# Patient Record
Sex: FEMALE | Race: BLACK OR AFRICAN AMERICAN | Employment: PART TIME | URBAN - METROPOLITAN AREA
[De-identification: names, ages, dates, MRNs, and addresses within clinical notes are randomized per-mention and may not be internally consistent; named-entity substitution may affect disease eponyms.]

---

## 2021-06-08 PROBLEM — E66.01 MORBID OBESITY (HCC): Status: ACTIVE | Noted: 2021-06-08

## 2021-06-08 PROBLEM — G47.33 OSA (OBSTRUCTIVE SLEEP APNEA): Status: ACTIVE | Noted: 2021-06-08

## 2021-06-08 PROBLEM — M54.9 BACK PAIN: Status: ACTIVE | Noted: 2021-06-08

## 2021-06-29 ENCOUNTER — HOSPITAL ENCOUNTER (OUTPATIENT)
Dept: PHYSICAL THERAPY | Age: 36
Discharge: HOME | End: 2021-06-29
Attending: SURGERY
Payer: COMMERCIAL

## 2021-06-29 DIAGNOSIS — E66.01 MORBID OBESITY (HCC): ICD-10-CM

## 2021-06-29 PROCEDURE — 97161 PT EVAL LOW COMPLEX 20 MIN: CPT

## 2021-06-29 NOTE — THERAPY EVALUATION
Barry Benson  : 1985  Primary: [facility]  Secondary:  [address]  at [facility] KRISTINA WHALEN  [address] [facility], [address] [place] [address],[address], [place] [address].  [phone]   [phone]       OUTPATIENT PHYSICAL THERAPY:Initial Assessment and Discharge Summary 2021     ICD-10: Treatment Diagnosis:  Morbid obesity  [E66.01]    Precautions/Allergies: NKDA     TREATMENT PLAN:  Effective Dates: 2021 for one visit MEDICAL/REFERRING DIAGNOSIS:  Morbid obesity ([facility] [E66.01]    DATE OF ONSET: gradual  REFERRING PHYSICIAN: Jaren Espino MD Orders: Eval and treat  Return MD Appointment: TBD     INITIAL ASSESSMENT:  Ms. Fiona Mcdonald is a 31year old female who comes to PT as part of the bariatric surgery program. She presents with good strength and balance and good ROM except as limited by adipose tissue. She has no issues that need to be addressed by PT at this time. She will be discharged from PT. PROBLEM LIST (Impacting functional limitations):  1. None INTERVENTIONS PLANNED: (Treatment may consist of any combination of the following)  1. None     GOALS: (Goals have been discussed and agreed upon with patient.)  Patient's stated goal is \"None\"  Discharge Goals: Time Frame: 1 visit  1. Patient to acknowledge the importance of exercise as part of a weight loss program. - MET    OUTCOME MEASURE:   Tool Used: Lower Extremity Functional Scale (LEFS)  Score:  Initial: 74/80 Most Recent: X (Date: -- )   Interpretation of Score: 20 questions each scored on a 5 point scale with 0 representing \"extreme difficulty or unable to perform\" and 4 representing \"no difficulty\". The lower the score, the greater the functional disability. 80/80 represents no disability. Minimal detectable change is 9 points.     Total Duration: 20 minutes  PT Patient Time In/Time Out  Time In: 7210  Time Out: 1287    Rehabilitation Potential For Stated Goals: Good  Regarding Rohini Madrigal's therapy, I certify that the treatment plan above will be carried out by a therapist or under their direction. Thank you for this referral,    Mai Ortiz, KRISSY      Referring Physician Signature: Chacha Mariano* No Signature is Required for this note. PAIN/SUBJECTIVE:   Initial: Pain Intensity 1: 0   Post Session:  None reported   HISTORY:   History of Injury/Illness (Reason for Referral):  Patient reports gradual gaining of weight over time. She used to be in [employer] and worked out regularly. She currently belongs to a gym. Past Medical History/Comorbidities:   From EMR and patient: Back pain, Morbid obesity, anxiety, and SHANTA (obstructive sleep apnea). Social History/Living Environment:     lives in one story home with 3 children - 15 8and 1years old. Prior Level of Function/Work/Activity:  Not working. Maintains the house. Ambulatory/Rehab Services H2 Model Falls Risk Assessment   Risk Factors:       No Risk Factors Identified Ability to Rise from Chair:       (0)  Ability to rise in a single movement   Falls Prevention Plan:       No modifications necessary   Total: (5 or greater = High Risk): 0   ©2010 [employer]. All Rights Reserved. [facility] Patent #[id number]. Federal Law prohibits the replication, distribution or use without written permission from [facility] [employer]   Current Medications: levonorgestrel-ethinyl estradiol   Date Last Reviewed:  6/29/21   Number of Personal Factors/Comorbidities that affect the Plan of Care: 0: LOW COMPLEXITY   EXAMINATION:     Observation/Orthostatic Postural Assessment: Obese patient in no acute distress. ROM: all ROM WFL except as limited by adipose tissue  Strength: 5/5 throughout UEs and LEs  Neurological Screen: light touch intact in UEs and LEs. Functional Mobility:  Independent        Balance:  SLS>= 10 seconds each leg     Body Structures Involved:  1. Joints  2.  Muscles Body Functions Affected:  1. Neuromusculoskeletal  2. Movement Related Activities and Participation Affected:  1. General Tasks and Demands  2.  Community, Social and [place]   Number of elements (examined above) that affect the Plan of Care: 1-2: LOW COMPLEXITY   CLINICAL PRESENTATION:   Presentation: Stable and uncomplicated: LOW COMPLEXITY   CLINICAL DECISION MAKING:   Use of outcome tool(s) and clinical judgement create a POC that gives a: Questionable prediction of patient's progress: MODERATE COMPLEXITY

## 2021-07-06 ENCOUNTER — HOSPITAL ENCOUNTER (OUTPATIENT)
Dept: LAB | Age: 36
Discharge: HOME | End: 2021-07-06
Attending: SURGERY
Payer: COMMERCIAL

## 2021-07-06 ENCOUNTER — HOSPITAL ENCOUNTER (OUTPATIENT)
Dept: GENERAL RADIOLOGY | Age: 36
Discharge: HOME | End: 2021-07-06
Attending: SURGERY
Payer: COMMERCIAL

## 2021-07-06 DIAGNOSIS — Z87.19 HX OF GASTROESOPHAGEAL REFLUX (GERD): ICD-10-CM

## 2021-07-06 DIAGNOSIS — Z01.812 ENCOUNTER FOR PRE-OPERATIVE LABORATORY TESTING: ICD-10-CM

## 2021-07-06 LAB
25(OH)D3 SERPL-MCNC: 11.7 NG/ML (ref 30–100)
EST. AVERAGE GLUCOSE BLD GHB EST-MCNC: NORMAL MG/DL
HBA1C MFR BLD: 4.8 % (ref 4.2–6.3)
TSH SERPL DL<=0.005 MIU/L-ACNC: 0.7 UIU/ML

## 2021-07-06 PROCEDURE — 74246 X-RAY XM UPR GI TRC 2CNTRST: CPT

## 2021-07-06 PROCEDURE — 36415 COLL VENOUS BLD VENIPUNCTURE: CPT

## 2021-07-06 PROCEDURE — 84443 ASSAY THYROID STIM HORMONE: CPT

## 2021-07-06 PROCEDURE — 74011000250 HC RX REV CODE- 250: Performed by: SURGERY

## 2021-07-06 PROCEDURE — 83036 HEMOGLOBIN GLYCOSYLATED A1C: CPT

## 2021-07-06 PROCEDURE — 80323 ALKALOIDS NOS: CPT

## 2021-07-06 PROCEDURE — 82306 VITAMIN D 25 HYDROXY: CPT

## 2021-07-06 RX ADMIN — ANTACID/ANTIFLATULENT 4 G: 380; 550; 10; 10 GRANULE, EFFERVESCENT ORAL at 08:20

## 2021-07-06 RX ADMIN — BARIUM SULFATE 135 ML: 980 POWDER, FOR SUSPENSION ORAL at 08:20

## 2021-07-06 RX ADMIN — BARIUM SULFATE 355 ML: 0.6 SUSPENSION ORAL at 08:24

## 2021-07-09 LAB
COTININE SERPL-MCNC: 1.9 NG/ML
NICOTINE SERPL-MCNC: <1 NG/ML

## 2021-09-03 ENCOUNTER — HOSPITAL ENCOUNTER (OUTPATIENT)
Dept: SURGERY | Age: 36
Discharge: HOME | End: 2021-09-03
Attending: SURGERY
Payer: COMMERCIAL

## 2021-09-03 VITALS
HEART RATE: 81 BPM | DIASTOLIC BLOOD PRESSURE: 72 MMHG | WEIGHT: 293 LBS | RESPIRATION RATE: 16 BRPM | OXYGEN SATURATION: 100 % | BODY MASS INDEX: 45.99 KG/M2 | SYSTOLIC BLOOD PRESSURE: 132 MMHG | HEIGHT: 67 IN | TEMPERATURE: 97.7 F

## 2021-09-03 LAB — HGB BLD-MCNC: 12.7 G/DL (ref 11.7–15.4)

## 2021-09-03 PROCEDURE — 36415 COLL VENOUS BLD VENIPUNCTURE: CPT

## 2021-09-03 PROCEDURE — 85018 HEMOGLOBIN: CPT

## 2021-09-03 RX ORDER — HYDROXYZINE 25 MG/1
25 TABLET, FILM COATED ORAL
COMMUNITY

## 2021-09-03 NOTE — PERIOP NOTES
Patient verified name and     Order for consent not found in EHR ; patient verified. Type 2 surgery, Walk in assessment complete. Labs per surgeon: none  Labs per anesthesia protocol: hgb; results - processing  EKG: not needed per protocols but most [facility]. Patient COVID test date 9/10/21. The testing center is located at the [address], [place]. If appointment is needed patient provided telephone number of [phone]. Hospital approved surgical skin cleanser and instructions given per hospital policy. Patient provided with and instructed on educational handouts including Guide to Surgery, Pain Management, Hand Hygiene, Blood Transfusion Education, and [facility] Brochure. Patient answered medical/surgical history questions at their best of ability. All prior to admission medications documented in [facility]. Original medication prescription bottle not visualized during patient appointment. Patient instructed to hold all vitamins 7 days prior to surgery and NSAIDS 5 days prior to surgery, patient verbalized understanding. Patient teach back successful and patient demonstrates knowledge of instructions.

## 2021-09-03 NOTE — PERIOP NOTES
The following records have been requested from [address] [place] [address] [facility] records:       Please send EKG x 2 (7/31/21, 7/7/20) via fax to [phone] for anesthesia review prior to upcoming surgery. Thank you!

## 2021-09-03 NOTE — PERIOP NOTES
PLEASE CONTINUE TAKING ALL PRESCRIPTION MEDICATIONS UP TO THE DAY OF SURGERY UNLESS OTHERWISE DIRECTED BELOW. DISCONTINUE all vitamins and supplements 7 days prior to surgery. DISCONTINUE Non-Steriodal Anti-Inflammatory (NSAIDS) such as Advil and Aleve 5 days prior to surgery. Home Medications to take  the day of surgery     Flexeril if needed      Hydroxyzine if needed     Birth control pill      Home Medications   to Hold     Hold Mobic for 5 days prior to surgery. Comments    Covid test 9/10/21  3:30 pm @ [address] [place] [address]    On the day before surgery please take Acetaminophen 1000mg in the morning and then again before bed. You may substitute for Tylenol 650 mg. Please do not bring home medications with you on the day of surgery unless otherwise directed by your nurse. If you are instructed to bring home medications, please give them to your nurse as they will be administered by the nursing staff. If you have any questions, please call [facility] [phone]     A copy of this note was provided to the patient for reference.

## 2021-09-03 NOTE — PERIOP NOTES
SURGICAL WEIGHT LOSS Enhanced Recovery After Surgery: diabetic and non-diabetic patients      The night before surgery 9/12/21, drink 1 bottles of the Ensure Pre-Surgery drink. The morning of surgery 9/13/21, drink 1/2 bottle of the Ensure Pre-Surgery drink while on your way to the hospital. Drink this over 5-10 minutes. Drink nothing else after drinking the pre-surgical drink the morning of surgery. Bring your patient handbook with you to the hospital.        Things to Remember:      1. You will be up in a chair the evening of surgery and sipping on clear liquids, once released by your surgeon. 2. Beginning the day of surgery, you will be out of the bed as able, once released by your hospital team. We encourage you to be sitting up in a chair, walking in the fields, doing exercises as advised by physical therapy, etc.       3. You will be given scheduled non-narcotic pain medication to help keep your pain under control. You will have stronger pain medication ordered for break through pain. 4. All of these measures are geared toward improving your overall surgical recovery and   decreasing the risk of complications.

## 2021-09-08 RX ORDER — CEPHALEXIN 500 MG/1
500 CAPSULE ORAL 3 TIMES DAILY
COMMUNITY
End: 2021-09-14

## 2021-09-09 NOTE — PERIOP NOTES
Received EKG dated 7/31/21 from [facility]. Record scanned into media tab in EHR for reference if needed.

## 2021-09-12 ENCOUNTER — ANESTHESIA EVENT (OUTPATIENT)
Dept: SURGERY | Age: 36
DRG: 403 | End: 2021-09-12
Payer: COMMERCIAL

## 2021-09-13 ENCOUNTER — HOSPITAL ENCOUNTER (INPATIENT)
Age: 36
LOS: 1 days | Discharge: HOME | DRG: 403 | End: 2021-09-14
Attending: SURGERY | Admitting: SURGERY
Payer: COMMERCIAL

## 2021-09-13 ENCOUNTER — ANESTHESIA (OUTPATIENT)
Dept: SURGERY | Age: 36
DRG: 403 | End: 2021-09-13
Payer: COMMERCIAL

## 2021-09-13 DIAGNOSIS — E66.01 MORBID OBESITY (HCC): ICD-10-CM

## 2021-09-13 DIAGNOSIS — G47.33 OSA (OBSTRUCTIVE SLEEP APNEA): ICD-10-CM

## 2021-09-13 LAB
ABO + RH BLD: NORMAL
BLOOD GROUP ANTIBODIES SERPL: NORMAL
HCG UR QL: NEGATIVE
SPECIMEN EXP DATE BLD: NORMAL

## 2021-09-13 PROCEDURE — 74011000250 HC RX REV CODE- 250: Performed by: REGISTERED NURSE

## 2021-09-13 PROCEDURE — 77030036554: Performed by: SURGERY

## 2021-09-13 PROCEDURE — 65270000029 HC RM PRIVATE

## 2021-09-13 PROCEDURE — 77030039425 HC BLD LARYNG TRULITE DISP TELE -A: Performed by: ANESTHESIOLOGY

## 2021-09-13 PROCEDURE — 74011000250 HC RX REV CODE- 250: Performed by: SURGERY

## 2021-09-13 PROCEDURE — 77030041460 HC APL VISTASEAL J&J -B: Performed by: SURGERY

## 2021-09-13 PROCEDURE — 77030020703 HC SEAL CANN DISP INTU -B: Performed by: SURGERY

## 2021-09-13 PROCEDURE — 77030040922 HC BLNKT HYPOTHRM STRY -A: Performed by: ANESTHESIOLOGY

## 2021-09-13 PROCEDURE — 77030031492 HC PRT ACC BLNT AIRSEAL CNMD -B: Performed by: SURGERY

## 2021-09-13 PROCEDURE — 74011250637 HC RX REV CODE- 250/637: Performed by: PHYSICIAN ASSISTANT

## 2021-09-13 PROCEDURE — 77030019908 HC STETH ESOPH SIMS -A: Performed by: ANESTHESIOLOGY

## 2021-09-13 PROCEDURE — 74011250637 HC RX REV CODE- 250/637: Performed by: NURSE PRACTITIONER

## 2021-09-13 PROCEDURE — 74011000250 HC RX REV CODE- 250: Performed by: PHYSICIAN ASSISTANT

## 2021-09-13 PROCEDURE — 76060000037 HC ANESTHESIA 3 TO 3.5 HR: Performed by: SURGERY

## 2021-09-13 PROCEDURE — 77030035279 HC SEAL VSL ENDOWR XI INTU -I2: Performed by: SURGERY

## 2021-09-13 PROCEDURE — 77030002996 HC SUT SLK J&J -A: Performed by: SURGERY

## 2021-09-13 PROCEDURE — 74011250636 HC RX REV CODE- 250/636: Performed by: PHYSICIAN ASSISTANT

## 2021-09-13 PROCEDURE — 43644 LAP GASTRIC BYPASS/ROUX-EN-Y: CPT | Performed by: SURGERY

## 2021-09-13 PROCEDURE — 77030035278 HC STPLR SEAL ENDOWR INTU -B: Performed by: SURGERY

## 2021-09-13 PROCEDURE — 86901 BLOOD TYPING SEROLOGIC RH(D): CPT

## 2021-09-13 PROCEDURE — 77030035277 HC OBTRTR BLDELSS DISP INTU -B: Performed by: SURGERY

## 2021-09-13 PROCEDURE — 74011250636 HC RX REV CODE- 250/636: Performed by: REGISTERED NURSE

## 2021-09-13 PROCEDURE — S2900 ROBOTIC SURGICAL SYSTEM: HCPCS | Performed by: SURGERY

## 2021-09-13 PROCEDURE — 77030016151 HC PROTCTR LNS DFOG COVD -B: Performed by: SURGERY

## 2021-09-13 PROCEDURE — 94760 N-INVAS EAR/PLS OXIMETRY 1: CPT

## 2021-09-13 PROCEDURE — 77030040361 HC SLV COMPR DVT MDII -B: Performed by: SURGERY

## 2021-09-13 PROCEDURE — 77030010513 HC APPL CLP LIG J&J -C: Performed by: SURGERY

## 2021-09-13 PROCEDURE — 74011250636 HC RX REV CODE- 250/636: Performed by: ANESTHESIOLOGY

## 2021-09-13 PROCEDURE — 77030040260 HC STPLR RELD SUREFORM DVNCI INTU -C: Performed by: SURGERY

## 2021-09-13 PROCEDURE — 81025 URINE PREGNANCY TEST: CPT

## 2021-09-13 PROCEDURE — 77030007955 HC PCH ENDOSC SPEC J&J -B: Performed by: SURGERY

## 2021-09-13 PROCEDURE — 2709999900 HC NON-CHARGEABLE SUPPLY

## 2021-09-13 PROCEDURE — 77030010507 HC ADH SKN DERMBND J&J -B: Performed by: SURGERY

## 2021-09-13 PROCEDURE — 77030031139 HC SUT VCRL2 J&J -A: Performed by: SURGERY

## 2021-09-13 PROCEDURE — 74011250636 HC RX REV CODE- 250/636: Performed by: SURGERY

## 2021-09-13 PROCEDURE — 77030035489 HC REDUCR CANN ENDOWR INTU -C: Performed by: SURGERY

## 2021-09-13 PROCEDURE — 0D164ZA BYPASS STOMACH TO JEJUNUM, PERCUTANEOUS ENDOSCOPIC APPROACH: ICD-10-PCS | Performed by: SURGERY

## 2021-09-13 PROCEDURE — 77030037088 HC TUBE ENDOTRACH ORAL NSL COVD-A: Performed by: ANESTHESIOLOGY

## 2021-09-13 PROCEDURE — 77030041524 HC SEALNT FIBRN VITASEAL J&J -C: Performed by: SURGERY

## 2021-09-13 PROCEDURE — APPNB30 APP NON BILLABLE TIME 0-30 MINS: Performed by: PHYSICIAN ASSISTANT

## 2021-09-13 PROCEDURE — 74011250637 HC RX REV CODE- 250/637: Performed by: ANESTHESIOLOGY

## 2021-09-13 PROCEDURE — 76210000006 HC OR PH I REC 0.5 TO 1 HR: Performed by: SURGERY

## 2021-09-13 PROCEDURE — 77030008771 HC TU NG SALEM SUMP -A: Performed by: ANESTHESIOLOGY

## 2021-09-13 PROCEDURE — 8E0W4CZ ROBOTIC ASSISTED PROCEDURE OF TRUNK REGION, PERCUTANEOUS ENDOSCOPIC APPROACH: ICD-10-PCS | Performed by: SURGERY

## 2021-09-13 PROCEDURE — 2709999900 HC NON-CHARGEABLE SUPPLY: Performed by: SURGERY

## 2021-09-13 PROCEDURE — 76010000878 HC OR TIME 3 TO 3.5HR INTENSV - TIER 2: Performed by: SURGERY

## 2021-09-13 PROCEDURE — 77030002933 HC SUT MCRYL J&J -A: Performed by: SURGERY

## 2021-09-13 PROCEDURE — 77030040259 HC STPLR DEV SUREFORM DVNCI INTU -F: Performed by: SURGERY

## 2021-09-13 PROCEDURE — C9113 INJ PANTOPRAZOLE SODIUM, VIA: HCPCS | Performed by: SURGERY

## 2021-09-13 PROCEDURE — 77030034461 HC TIP ENDO SUC IRR STRYKFLW STRY -B: Performed by: SURGERY

## 2021-09-13 RX ORDER — KETOROLAC TROMETHAMINE 30 MG/ML
15 INJECTION, SOLUTION INTRAMUSCULAR; INTRAVENOUS EVERY 6 HOURS
Status: DISCONTINUED | OUTPATIENT
Start: 2021-09-13 | End: 2021-09-14 | Stop reason: HOSPADM

## 2021-09-13 RX ORDER — BUPIVACAINE HYDROCHLORIDE 2.5 MG/ML
INJECTION, SOLUTION EPIDURAL; INFILTRATION; INTRACAUDAL AS NEEDED
Status: DISCONTINUED | OUTPATIENT
Start: 2021-09-13 | End: 2021-09-13 | Stop reason: HOSPADM

## 2021-09-13 RX ORDER — GABAPENTIN 300 MG/1
300 CAPSULE ORAL 3 TIMES DAILY
Status: DISCONTINUED | OUTPATIENT
Start: 2021-09-13 | End: 2021-09-14 | Stop reason: HOSPADM

## 2021-09-13 RX ORDER — ACETAMINOPHEN 500 MG
1000 TABLET ORAL ONCE
Status: COMPLETED | OUTPATIENT
Start: 2021-09-13 | End: 2021-09-13

## 2021-09-13 RX ORDER — SODIUM CHLORIDE AND POTASSIUM CHLORIDE .9; .15 G/100ML; G/100ML
SOLUTION INTRAVENOUS CONTINUOUS
Status: DISCONTINUED | OUTPATIENT
Start: 2021-09-13 | End: 2021-09-13

## 2021-09-13 RX ORDER — ONDANSETRON 2 MG/ML
INJECTION INTRAMUSCULAR; INTRAVENOUS AS NEEDED
Status: DISCONTINUED | OUTPATIENT
Start: 2021-09-13 | End: 2021-09-13 | Stop reason: HOSPADM

## 2021-09-13 RX ORDER — HYDROMORPHONE HYDROCHLORIDE 1 MG/ML
0.5 INJECTION, SOLUTION INTRAMUSCULAR; INTRAVENOUS; SUBCUTANEOUS
Status: DISCONTINUED | OUTPATIENT
Start: 2021-09-13 | End: 2021-09-14 | Stop reason: HOSPADM

## 2021-09-13 RX ORDER — LIDOCAINE HYDROCHLORIDE ANHYDROUS AND DEXTROSE MONOHYDRATE .8; 5 G/100ML; G/100ML
1 INJECTION, SOLUTION INTRAVENOUS CONTINUOUS
Status: DISCONTINUED | OUTPATIENT
Start: 2021-09-13 | End: 2021-09-13 | Stop reason: SDUPTHER

## 2021-09-13 RX ORDER — SUCRALFATE 1 G/1
1 TABLET ORAL 4 TIMES DAILY
Status: DISCONTINUED | OUTPATIENT
Start: 2021-09-13 | End: 2021-09-14 | Stop reason: HOSPADM

## 2021-09-13 RX ORDER — OXYCODONE HYDROCHLORIDE 5 MG/1
5 TABLET ORAL
Status: DISCONTINUED | OUTPATIENT
Start: 2021-09-13 | End: 2021-09-13 | Stop reason: HOSPADM

## 2021-09-13 RX ORDER — SODIUM CHLORIDE AND POTASSIUM CHLORIDE .9; .15 G/100ML; G/100ML
SOLUTION INTRAVENOUS CONTINUOUS
Status: DISCONTINUED | OUTPATIENT
Start: 2021-09-13 | End: 2021-09-14 | Stop reason: HOSPADM

## 2021-09-13 RX ORDER — ROCURONIUM BROMIDE 10 MG/ML
INJECTION, SOLUTION INTRAVENOUS AS NEEDED
Status: DISCONTINUED | OUTPATIENT
Start: 2021-09-13 | End: 2021-09-13 | Stop reason: HOSPADM

## 2021-09-13 RX ORDER — SUCCINYLCHOLINE CHLORIDE 20 MG/ML
INJECTION INTRAMUSCULAR; INTRAVENOUS AS NEEDED
Status: DISCONTINUED | OUTPATIENT
Start: 2021-09-13 | End: 2021-09-13 | Stop reason: HOSPADM

## 2021-09-13 RX ORDER — SCOLOPAMINE TRANSDERMAL SYSTEM 1 MG/1
1 PATCH, EXTENDED RELEASE TRANSDERMAL ONCE
Status: DISCONTINUED | OUTPATIENT
Start: 2021-09-13 | End: 2021-09-13 | Stop reason: HOSPADM

## 2021-09-13 RX ORDER — SODIUM CHLORIDE, SODIUM LACTATE, POTASSIUM CHLORIDE, CALCIUM CHLORIDE 600; 310; 30; 20 MG/100ML; MG/100ML; MG/100ML; MG/100ML
INJECTION, SOLUTION INTRAVENOUS
Status: DISCONTINUED | OUTPATIENT
Start: 2021-09-13 | End: 2021-09-13

## 2021-09-13 RX ORDER — PROPOFOL 10 MG/ML
INJECTION, EMULSION INTRAVENOUS AS NEEDED
Status: DISCONTINUED | OUTPATIENT
Start: 2021-09-13 | End: 2021-09-13 | Stop reason: HOSPADM

## 2021-09-13 RX ORDER — LIDOCAINE HYDROCHLORIDE ANHYDROUS AND DEXTROSE MONOHYDRATE .8; 5 G/100ML; G/100ML
1 INJECTION, SOLUTION INTRAVENOUS CONTINUOUS
Status: DISPENSED | OUTPATIENT
Start: 2021-09-13 | End: 2021-09-14

## 2021-09-13 RX ORDER — METOCLOPRAMIDE 10 MG/1
5 TABLET ORAL ONCE
Status: COMPLETED | OUTPATIENT
Start: 2021-09-13 | End: 2021-09-13

## 2021-09-13 RX ORDER — FENTANYL CITRATE 50 UG/ML
INJECTION, SOLUTION INTRAMUSCULAR; INTRAVENOUS AS NEEDED
Status: DISCONTINUED | OUTPATIENT
Start: 2021-09-13 | End: 2021-09-13 | Stop reason: HOSPADM

## 2021-09-13 RX ORDER — PANTOPRAZOLE SODIUM 40 MG/10ML
40 INJECTION, POWDER, LYOPHILIZED, FOR SOLUTION INTRAVENOUS DAILY
Status: DISCONTINUED | OUTPATIENT
Start: 2021-09-13 | End: 2021-09-13 | Stop reason: SDUPTHER

## 2021-09-13 RX ORDER — APREPITANT 40 MG/1
40 CAPSULE ORAL ONCE
Status: COMPLETED | OUTPATIENT
Start: 2021-09-13 | End: 2021-09-13

## 2021-09-13 RX ORDER — OXYCODONE HYDROCHLORIDE 5 MG/1
5 TABLET ORAL
Status: DISCONTINUED | OUTPATIENT
Start: 2021-09-13 | End: 2021-09-13 | Stop reason: ALTCHOICE

## 2021-09-13 RX ORDER — HYDROMORPHONE HYDROCHLORIDE 2 MG/ML
0.5 INJECTION, SOLUTION INTRAMUSCULAR; INTRAVENOUS; SUBCUTANEOUS
Status: DISCONTINUED | OUTPATIENT
Start: 2021-09-13 | End: 2021-09-13 | Stop reason: HOSPADM

## 2021-09-13 RX ORDER — DEXAMETHASONE SODIUM PHOSPHATE 4 MG/ML
INJECTION, SOLUTION INTRA-ARTICULAR; INTRALESIONAL; INTRAMUSCULAR; INTRAVENOUS; SOFT TISSUE AS NEEDED
Status: DISCONTINUED | OUTPATIENT
Start: 2021-09-13 | End: 2021-09-13 | Stop reason: HOSPADM

## 2021-09-13 RX ORDER — SODIUM CHLORIDE, SODIUM LACTATE, POTASSIUM CHLORIDE, CALCIUM CHLORIDE 600; 310; 30; 20 MG/100ML; MG/100ML; MG/100ML; MG/100ML
25 INJECTION, SOLUTION INTRAVENOUS CONTINUOUS
Status: DISCONTINUED | OUTPATIENT
Start: 2021-09-13 | End: 2021-09-13 | Stop reason: HOSPADM

## 2021-09-13 RX ORDER — ALBUTEROL SULFATE 0.83 MG/ML
2.5 SOLUTION RESPIRATORY (INHALATION) AS NEEDED
Status: DISCONTINUED | OUTPATIENT
Start: 2021-09-13 | End: 2021-09-13 | Stop reason: HOSPADM

## 2021-09-13 RX ORDER — HEPARIN SODIUM 5000 [USP'U]/ML
5000 INJECTION, SOLUTION INTRAVENOUS; SUBCUTANEOUS ONCE
Status: COMPLETED | OUTPATIENT
Start: 2021-09-13 | End: 2021-09-13

## 2021-09-13 RX ORDER — LIDOCAINE HYDROCHLORIDE 10 MG/ML
0.1 INJECTION INFILTRATION; PERINEURAL AS NEEDED
Status: DISCONTINUED | OUTPATIENT
Start: 2021-09-13 | End: 2021-09-13 | Stop reason: HOSPADM

## 2021-09-13 RX ORDER — HEPARIN SODIUM 5000 [USP'U]/ML
5000 INJECTION, SOLUTION INTRAVENOUS; SUBCUTANEOUS EVERY 8 HOURS
Status: DISCONTINUED | OUTPATIENT
Start: 2021-09-13 | End: 2021-09-14 | Stop reason: HOSPADM

## 2021-09-13 RX ORDER — KETAMINE HYDROCHLORIDE 50 MG/ML
INJECTION, SOLUTION INTRAMUSCULAR; INTRAVENOUS AS NEEDED
Status: DISCONTINUED | OUTPATIENT
Start: 2021-09-13 | End: 2021-09-13 | Stop reason: HOSPADM

## 2021-09-13 RX ORDER — CELECOXIB 100 MG/1
100 CAPSULE ORAL 2 TIMES DAILY
Status: DISCONTINUED | OUTPATIENT
Start: 2021-09-14 | End: 2021-09-13

## 2021-09-13 RX ORDER — LIDOCAINE HYDROCHLORIDE 20 MG/ML
INJECTION, SOLUTION EPIDURAL; INFILTRATION; INTRACAUDAL; PERINEURAL AS NEEDED
Status: DISCONTINUED | OUTPATIENT
Start: 2021-09-13 | End: 2021-09-13 | Stop reason: HOSPADM

## 2021-09-13 RX ORDER — ONDANSETRON 2 MG/ML
4 INJECTION INTRAMUSCULAR; INTRAVENOUS
Status: DISCONTINUED | OUTPATIENT
Start: 2021-09-13 | End: 2021-09-13 | Stop reason: HOSPADM

## 2021-09-13 RX ORDER — SCOLOPAMINE TRANSDERMAL SYSTEM 1 MG/1
1 PATCH, EXTENDED RELEASE TRANSDERMAL ONCE
Status: DISCONTINUED | OUTPATIENT
Start: 2021-09-13 | End: 2021-09-14 | Stop reason: HOSPADM

## 2021-09-13 RX ORDER — DIPHENHYDRAMINE HYDROCHLORIDE 50 MG/ML
12.5 INJECTION, SOLUTION INTRAMUSCULAR; INTRAVENOUS
Status: DISCONTINUED | OUTPATIENT
Start: 2021-09-13 | End: 2021-09-14 | Stop reason: HOSPADM

## 2021-09-13 RX ORDER — ACETAMINOPHEN 500 MG
1000 TABLET ORAL EVERY 6 HOURS
Status: DISCONTINUED | OUTPATIENT
Start: 2021-09-13 | End: 2021-09-14 | Stop reason: HOSPADM

## 2021-09-13 RX ORDER — LIDOCAINE HYDROCHLORIDE ANHYDROUS AND DEXTROSE MONOHYDRATE .8; 5 G/100ML; G/100ML
INJECTION, SOLUTION INTRAVENOUS
Status: DISCONTINUED | OUTPATIENT
Start: 2021-09-13 | End: 2021-09-13 | Stop reason: HOSPADM

## 2021-09-13 RX ORDER — NALOXONE HYDROCHLORIDE 0.4 MG/ML
0.4 INJECTION, SOLUTION INTRAMUSCULAR; INTRAVENOUS; SUBCUTANEOUS AS NEEDED
Status: DISCONTINUED | OUTPATIENT
Start: 2021-09-13 | End: 2021-09-14 | Stop reason: HOSPADM

## 2021-09-13 RX ORDER — TRAMADOL HYDROCHLORIDE 50 MG/1
50 TABLET ORAL
Status: DISCONTINUED | OUTPATIENT
Start: 2021-09-13 | End: 2021-09-14 | Stop reason: HOSPADM

## 2021-09-13 RX ORDER — ONDANSETRON 2 MG/ML
4 INJECTION INTRAMUSCULAR; INTRAVENOUS EVERY 4 HOURS
Status: DISCONTINUED | OUTPATIENT
Start: 2021-09-13 | End: 2021-09-14 | Stop reason: HOSPADM

## 2021-09-13 RX ORDER — APREPITANT 40 MG/1
40 CAPSULE ORAL ONCE
Status: DISCONTINUED | OUTPATIENT
Start: 2021-09-13 | End: 2021-09-13 | Stop reason: HOSPADM

## 2021-09-13 RX ORDER — MIDAZOLAM HYDROCHLORIDE 1 MG/ML
2 INJECTION, SOLUTION INTRAMUSCULAR; INTRAVENOUS
Status: COMPLETED | OUTPATIENT
Start: 2021-09-13 | End: 2021-09-13

## 2021-09-13 RX ORDER — KETOROLAC TROMETHAMINE 30 MG/ML
INJECTION, SOLUTION INTRAMUSCULAR; INTRAVENOUS AS NEEDED
Status: DISCONTINUED | OUTPATIENT
Start: 2021-09-13 | End: 2021-09-13 | Stop reason: HOSPADM

## 2021-09-13 RX ADMIN — ROCURONIUM BROMIDE 30 MG: 10 INJECTION, SOLUTION INTRAVENOUS at 07:50

## 2021-09-13 RX ADMIN — SUCCINYLCHOLINE CHLORIDE 160 MG: 20 INJECTION, SOLUTION INTRAMUSCULAR; INTRAVENOUS at 07:51

## 2021-09-13 RX ADMIN — SUGAMMADEX 300 MG: 100 INJECTION, SOLUTION INTRAVENOUS at 10:31

## 2021-09-13 RX ADMIN — ROCURONIUM BROMIDE 10 MG: 10 INJECTION, SOLUTION INTRAVENOUS at 08:41

## 2021-09-13 RX ADMIN — LIDOCAINE HYDROCHLORIDE 100 MG: 20 INJECTION, SOLUTION EPIDURAL; INFILTRATION; INTRACAUDAL; PERINEURAL at 07:50

## 2021-09-13 RX ADMIN — HYDROMORPHONE HYDROCHLORIDE 0.5 MG: 1 INJECTION, SOLUTION INTRAMUSCULAR; INTRAVENOUS; SUBCUTANEOUS at 21:00

## 2021-09-13 RX ADMIN — ACETAMINOPHEN 1000 MG: 500 TABLET, FILM COATED ORAL at 21:03

## 2021-09-13 RX ADMIN — ACETAMINOPHEN 1000 MG: 500 TABLET, FILM COATED ORAL at 06:46

## 2021-09-13 RX ADMIN — ONDANSETRON 4 MG: 2 INJECTION INTRAMUSCULAR; INTRAVENOUS at 20:29

## 2021-09-13 RX ADMIN — PHENYLEPHRINE HYDROCHLORIDE 100 MCG: 10 INJECTION INTRAVENOUS at 08:23

## 2021-09-13 RX ADMIN — ROCURONIUM BROMIDE 20 MG: 10 INJECTION, SOLUTION INTRAVENOUS at 09:30

## 2021-09-13 RX ADMIN — KETOROLAC TROMETHAMINE 15 MG: 30 INJECTION, SOLUTION INTRAMUSCULAR; INTRAVENOUS at 16:03

## 2021-09-13 RX ADMIN — FENTANYL CITRATE 50 MCG: 50 INJECTION INTRAMUSCULAR; INTRAVENOUS at 07:50

## 2021-09-13 RX ADMIN — GABAPENTIN 300 MG: 300 CAPSULE ORAL at 16:04

## 2021-09-13 RX ADMIN — PROPOFOL 200 MG: 10 INJECTION, EMULSION INTRAVENOUS at 07:50

## 2021-09-13 RX ADMIN — ONDANSETRON 4 MG: 2 INJECTION INTRAMUSCULAR; INTRAVENOUS at 12:40

## 2021-09-13 RX ADMIN — PHENYLEPHRINE HYDROCHLORIDE 100 MCG: 10 INJECTION INTRAVENOUS at 08:33

## 2021-09-13 RX ADMIN — CEFAZOLIN 3 G: 1 INJECTION, POWDER, FOR SOLUTION INTRAVENOUS at 08:09

## 2021-09-13 RX ADMIN — SUCRALFATE 1 G: 1 TABLET ORAL at 21:03

## 2021-09-13 RX ADMIN — SODIUM CHLORIDE 40 MG: 9 INJECTION, SOLUTION INTRAMUSCULAR; INTRAVENOUS; SUBCUTANEOUS at 20:29

## 2021-09-13 RX ADMIN — METOCLOPRAMIDE 5 MG: 10 TABLET ORAL at 06:46

## 2021-09-13 RX ADMIN — GABAPENTIN 300 MG: 300 CAPSULE ORAL at 21:03

## 2021-09-13 RX ADMIN — OXYCODONE 5 MG: 5 TABLET ORAL at 16:07

## 2021-09-13 RX ADMIN — PHENYLEPHRINE HYDROCHLORIDE 100 MCG: 10 INJECTION INTRAVENOUS at 07:59

## 2021-09-13 RX ADMIN — SUCRALFATE 1 G: 1 TABLET ORAL at 17:34

## 2021-09-13 RX ADMIN — PHENYLEPHRINE HYDROCHLORIDE 150 MCG: 10 INJECTION INTRAVENOUS at 08:19

## 2021-09-13 RX ADMIN — PHENYLEPHRINE HYDROCHLORIDE 100 MCG: 10 INJECTION INTRAVENOUS at 08:05

## 2021-09-13 RX ADMIN — MIDAZOLAM 2 MG: 1 INJECTION INTRAMUSCULAR; INTRAVENOUS at 07:10

## 2021-09-13 RX ADMIN — KETAMINE HYDROCHLORIDE 50 MG: 50 INJECTION, SOLUTION INTRAMUSCULAR; INTRAVENOUS at 07:50

## 2021-09-13 RX ADMIN — ACETAMINOPHEN 1000 MG: 500 TABLET, FILM COATED ORAL at 12:40

## 2021-09-13 RX ADMIN — ROCURONIUM BROMIDE 10 MG: 10 INJECTION, SOLUTION INTRAVENOUS at 09:54

## 2021-09-13 RX ADMIN — DEXAMETHASONE SODIUM PHOSPHATE 10 MG: 4 INJECTION, SOLUTION INTRAMUSCULAR; INTRAVENOUS at 09:37

## 2021-09-13 RX ADMIN — FOLIC ACID: 5 INJECTION, SOLUTION INTRAMUSCULAR; INTRAVENOUS; SUBCUTANEOUS at 12:52

## 2021-09-13 RX ADMIN — ONDANSETRON 4 MG: 2 INJECTION INTRAMUSCULAR; INTRAVENOUS at 10:27

## 2021-09-13 RX ADMIN — ROCURONIUM BROMIDE 20 MG: 10 INJECTION, SOLUTION INTRAVENOUS at 08:11

## 2021-09-13 RX ADMIN — PHENYLEPHRINE HYDROCHLORIDE 100 MCG: 10 INJECTION INTRAVENOUS at 08:30

## 2021-09-13 RX ADMIN — SODIUM CHLORIDE, SODIUM LACTATE, POTASSIUM CHLORIDE, AND CALCIUM CHLORIDE 900 ML/HR: 600; 310; 30; 20 INJECTION, SOLUTION INTRAVENOUS at 07:10

## 2021-09-13 RX ADMIN — FENTANYL CITRATE 25 MCG: 50 INJECTION INTRAMUSCULAR; INTRAVENOUS at 09:38

## 2021-09-13 RX ADMIN — HEPARIN SODIUM 5000 UNITS: 5000 INJECTION INTRAVENOUS; SUBCUTANEOUS at 07:05

## 2021-09-13 RX ADMIN — ROCURONIUM BROMIDE 10 MG: 10 INJECTION, SOLUTION INTRAVENOUS at 08:50

## 2021-09-13 RX ADMIN — KETOROLAC TROMETHAMINE 30 MG: 30 INJECTION, SOLUTION INTRAMUSCULAR; INTRAVENOUS at 10:41

## 2021-09-13 RX ADMIN — SODIUM CHLORIDE, SODIUM LACTATE, POTASSIUM CHLORIDE, AND CALCIUM CHLORIDE: 600; 310; 30; 20 INJECTION, SOLUTION INTRAVENOUS at 08:26

## 2021-09-13 RX ADMIN — HYDROMORPHONE HYDROCHLORIDE 0.5 MG: 2 INJECTION, SOLUTION INTRAMUSCULAR; INTRAVENOUS; SUBCUTANEOUS at 10:55

## 2021-09-13 RX ADMIN — SUCRALFATE 1 G: 1 TABLET ORAL at 12:40

## 2021-09-13 RX ADMIN — PROMETHAZINE HYDROCHLORIDE 12.5 MG: 25 INJECTION INTRAMUSCULAR; INTRAVENOUS at 22:04

## 2021-09-13 RX ADMIN — ROCURONIUM BROMIDE 10 MG: 10 INJECTION, SOLUTION INTRAVENOUS at 08:29

## 2021-09-13 RX ADMIN — OXYCODONE 5 MG: 5 TABLET ORAL at 12:40

## 2021-09-13 RX ADMIN — HYDROMORPHONE HYDROCHLORIDE 0.5 MG: 2 INJECTION, SOLUTION INTRAMUSCULAR; INTRAVENOUS; SUBCUTANEOUS at 11:10

## 2021-09-13 RX ADMIN — PHENYLEPHRINE HYDROCHLORIDE 100 MCG: 10 INJECTION INTRAVENOUS at 08:37

## 2021-09-13 RX ADMIN — HEPARIN SODIUM 5000 UNITS: 5000 INJECTION INTRAVENOUS; SUBCUTANEOUS at 21:01

## 2021-09-13 RX ADMIN — PHENYLEPHRINE HYDROCHLORIDE 100 MCG: 10 INJECTION INTRAVENOUS at 08:17

## 2021-09-13 RX ADMIN — LIDOCAINE HYDROCHLORIDE 1 MG/KG/HR: 8 INJECTION, SOLUTION INTRAVENOUS at 08:04

## 2021-09-13 RX ADMIN — APREPITANT 40 MG: 40 CAPSULE ORAL at 06:45

## 2021-09-13 RX ADMIN — HYDROMORPHONE HYDROCHLORIDE 0.5 MG: 1 INJECTION, SOLUTION INTRAMUSCULAR; INTRAVENOUS; SUBCUTANEOUS at 17:36

## 2021-09-13 RX ADMIN — DIPHENHYDRAMINE HYDROCHLORIDE 12.5 MG: 50 INJECTION, SOLUTION INTRAMUSCULAR; INTRAVENOUS at 22:04

## 2021-09-13 RX ADMIN — TRAMADOL HYDROCHLORIDE 50 MG: 50 TABLET, FILM COATED ORAL at 22:01

## 2021-09-13 RX ADMIN — ONDANSETRON 4 MG: 2 INJECTION INTRAMUSCULAR; INTRAVENOUS at 16:03

## 2021-09-13 RX ADMIN — SODIUM CHLORIDE 40 MG: 9 INJECTION, SOLUTION INTRAMUSCULAR; INTRAVENOUS; SUBCUTANEOUS at 12:40

## 2021-09-13 RX ADMIN — KETAMINE HYDROCHLORIDE 25 MG: 50 INJECTION, SOLUTION INTRAMUSCULAR; INTRAVENOUS at 09:09

## 2021-09-13 RX ADMIN — FENTANYL CITRATE 25 MCG: 50 INJECTION INTRAMUSCULAR; INTRAVENOUS at 10:43

## 2021-09-13 RX ADMIN — SODIUM CHLORIDE 60 MCG/MIN: 900 INJECTION, SOLUTION INTRAVENOUS at 08:37

## 2021-09-13 RX ADMIN — PHENYLEPHRINE HYDROCHLORIDE 150 MCG: 10 INJECTION INTRAVENOUS at 08:28

## 2021-09-13 NOTE — H&P
Niya Ojeda MD   Bariatric & Advanced Laparoscopic Surgery & Endoscopy  [address] [place], [place] [address]  Phone [phone]   Fax [phone]      Date of visit: 2021      Primary/Requesting provider: Aminah Damon DO         Name: Jennifer Morales      MRN: [id number]       : 1985       Age: 31 y.o. Sex: female        PCP: Aminah Damon DO     CC:    No chief complaint on file. Procedure: laparoscopic gastric bypass surgery    Surgical Consult Date: 06/15/2021    Surgical Date: TBD     The patient is a 31 y.o. female presenting with a height of 5' 7\" (1.702 m) and weight of 297 lb (134.7 kg), giving her a Body mass index is 46.52 kg/m². She has an Ideal body weight of 157 lbs, and excess body weight of 139 lbs. She started our program with a weight of 294 lbs, gaining 2 lbs. She has completed all aspects of our prep program and has been deemed an acceptable candidate for bariatric surgery. 30-Day Bariatric Surgical Risk Percentage: 7.93%    PSYCHOLOGICAL EVALUATION:   Completed, 2021 with Esthela Henderson deeming her and appropriate surgical candidate. DIETITIAN EVALUATION:  Completed with Jermain Quiroga deeming her an appropriate surgical candidate. BARIATRIC LABS:  Completed, 2021 (vitamin D 11.7)    UPPER GI:  Completed, 2021  HISTORY: Preoperative evaluation for gastric bypass surgery. No current  complaints.     COMPARISON: None.     Findings: A  film of the abdomen was obtained. The intestinal gas pattern  shows no evidence of obstruction.     A double contrast upper GI series was performed. Barium transits the esophagus  to the stomach without obstruction or stricture. The esophageal mucosal pattern  and motility are unremarkable. There was a small amount of spontaneous  gastroesophageal reflux.  There was no hiatal hernia.     The gastric contour, rugal mucosal pattern and motility were unremarkable. The  pylorus and duodenal bulb distend without distortion. There is no evidence of  gastric or duodenal ulcer disease. The duodenal sweep is normal.     24 fluoroscopic images were submitted.     1.9 minutes of fluoroscopy was used for this exam.     IMPRESSION  Gastroesophageal reflux. PHYSICAL THERAPY EVALUATION FOR MOBILITY OPTIMIZATION:   Completed, 06/29/2021    We have reviewed the procedure again today and completed our standard pre op teaching. Her questions were answered and she is ready to schedule surgery. We have discussed the procedure, diet and exercise regimens, and potential complications of surgery. The patient understands the nature and potential complication of surgery and has the capacity to follow the post operative diet, exercise, and nutritional requirements. After review, she has signed her surgical consent today. MEDICAL HISTORY:  Morbid Obesity  Anxiety  Back pain  Migraines     Comorbidity Yes or No   Hypertension No   Hyperlipidemia No   Diabetes Mellitus No   Coronary Artery Disease No   Gastroesophageal Reflux No   Obstructive Sleep Apnea Yes - on CPAP   Cancer No   Asthma No   Osteoarthritis No   Joint Pain Yes - BL knee      She denies GERD but does reports regurgitation. She denies dysphagia.      Other Yes or No   Venous Stasis No   Dialysis No   Long term use of steroid or immunocompromised conditions No   On Anticoagulants No         DENTAL/CHEWING ABILITY:  No dental issues with chewing.      PRIOR WEIGHT LOSS ATTEMPTS:  4-10 attempts, including Obesity Medicine, Weight Watchers, and Nutritionist/Dietitian.      EXERCISE ASSESSMENT:  No current exercise routine. Reviewed NIH guidelines.      PSYCHOSOCIAL:  She notes she is Single. She is employed as a  at [employer]. Her goal in pursuing surgical weight loss is to improve health. She reports appropriate treatment of anxiety.   She states she is independent in her care, can drive a car, and can ambulate without assistance. Patient has a long term history of obesity with multiple failed attempts at weight reduction. Patient denies any changes in health history or physical health since last visit. Patient has been adherent to her diet and exercise regimen. Patient feels that she is well informed regarding her bariatric surgery choice and would like to proceed with a laparoscopic gastric bypass for weight reduction, improvement of her medical conditions, and improved quality of life. Patient verbalized understanding of the risks associated with bariatric surgery. Patient also verbalized understanding that bariatric surgery is a tool and that weight reduction is dependent on behavioral changes in regards to what she eats and how much. PMH:    Past Medical History:   Diagnosis Date    Anxiety     Back pain     History of COVID-19 07/2020    SOB, cough, body aches, F, loss of taste & smell    History of palpitations     Migraine     Morbid obesity (HCC)     SHANTA (obstructive sleep apnea)     does not use CPAP but will bring DOS       PSH:    Past Surgical History:   Procedure Laterality Date    HX WISDOM TEETH EXTRACTION         MEDS:    Current Facility-Administered Medications   Medication    ceFAZolin (ANCEF) 3 g/30 mL in sterile water IV syringe    aprepitant (EMEND) capsule 40 mg    scopolamine (TRANSDERM-SCOP) 1 mg over 3 days 1 Patch    lidocaine (XYLOCAINE) 10 mg/mL (1 %) injection 0.1 mL    lactated Ringers infusion    scopolamine (TRANSDERM-SCOP) 1 mg over 3 days 1 Patch       ALLERGIES:      No Known Allergies    SH:    Social History     Tobacco Use    Smoking status: Never Smoker   Substance Use Topics    Alcohol use: Not Currently    Drug use: Never       FH:    Denies any family history of colon cancer.         Physical Exam:     Visit Vitals  BP (!) 144/88 (BP 1 Location: Right upper arm, BP Patient Position: Sitting)   Pulse 79   Temp 97 °F (36.1 °C)   Resp 18   Ht 5' 7\" (1.702 m)   Wt 297 lb (134.7 kg)   SpO2 97%   BMI 46.52 kg/m²       General:  Well-developed, well-nourished, no distress. Psych:  Cooperative, good insight and judgement. Neuro:  Alert, oriented to person, place and time. HEENT:  Normocephalic, atraumatic. Sclera clear. Lungs:  Unlabored breathing. Symmetrical chest expansion. Chest wall:  No tenderness or deformity. Heart:  Regular rate and rhythm. No JVD. Abdomen:  Soft, obese, non-tender, non-distended. No guarding or rebound. No palpable masses. Extremities:  Extremities normal, atraumatic, no cyanosis or edema. Skin:  Skin color, texture, turgor normal. No rashes. Labs: All recent labs were reviewed. Imaging: All images were independently reviewed by me. Assessment/Plan:  Buffy Brown is a 31 y.o. female is here her preoperative visit prior to bariatric surgery. 1. Patient is an excellent candidate for bariatric surgery and meets NIH criteria for weight loss surgery. Patient has clear medical necessity for bariatric surgery. Patient is well informed, motivated, and has a strong desire for weight loss. 2. In detail, discussed risks and benefits of laparoscopic gastric bypass surgery. We discussed specific risks of gastric bypass including but not limited to: pain, infection, bleeding, scar, excess skin, conversion to open approach, hernia, injury to adjacent organs, need for blood transfusion, thromboembolic complications, gastrointestinal leak, stricture, difficulty swallowing, need for revisional surgery, need for gastrostomy/feeding tube, gastroesophageal reflux, esophagitis, need for revisional surgery, failure to lose weight or weight regain, nutritional deficiencies, heart attack, stroke, death.        Signed: Leonila Holloway MD  Bariatric & Minimally Invasive Surgery  9/13/2021

## 2021-09-13 NOTE — OP NOTES
Operative Report    Name: Stanislav Miller      MRN: [id number]       : 1985       Age: 31 y.o. Sex: female    Date of Surgery: 2021     Preoperative Diagnosis: Morbid obesity ([facility] [E66.01]     Postoperative Diagnosis: Morbid obesity ([facility] [E66.01]     Name of procedure:    1. ROBOTIC ASSISTED LAPAROSCOPIC GASTRIC BYPASS CPT 60336  2. ESOPHAGOGASTRODUODENOSCOPY    Surgeon: Courtney Aldridge MD     Assistant: None    Anesthesia: General     Complications: None    Estimated Blood Loss: Minimal           Specimens: * No specimens in log *    Implants:  * No implants in log *    Findings:  Normal appearing intra-abdominal anatomy. Negative leak test. Normal appearing gastric and esophageal mucousa without staple line bleeding. Statement of Medical Necessity: Stanislav Miller is a 31 y.o. female who presented to the clinic with history of morbid obesity and Body mass index is 46.52 kg/m². Vasyl Maicol She failed multiple weight loss attempts meets the NIH criteria for obesity surgery. She decided for a robotic assisted laparoscopic vs open gastric bypass and EGD. We discussed specific risks of sleeve gastrectomy including but not limited to: pain, infection, bleeding, scar, excess skin, conversion to open approach, hernia, injury to adjacent organs, need for blood transfusion, thromboembolic complications, gastrointestinal leak, stricture, difficulty swallowing, need for revisional surgery, gastroesophageal reflux, esophagitis or esophageal cancer, need for revisional surgery, failure to lose weight or weight regain, nutritional deficiencies, heart attack, stroke, death. Patient understood and agreed to proceed. Procedure Details   After informed consent was taken, patient was taken to the operating room and placed in supine position with padding in all pressure points. Anesthesia was induced and patient was intubated. Patient received preoperative antibiotics.  Abdomen was prepped and draped in standard sterile fashion. A timeout was performed with all team members present. A 1 cm horizontal incision was made 17 cm from sternal notch to the left of the umbilicus. A Veress needle was inserted. Saline drop test was normal. The abdomen was insufflated with carbon dioxide to a pressure of 15 mmHg. The patient tolerated the insufflation well. A 8 mm robotic trocar was inserted bluntly. A general survey was performed and no injury was observed from trocar placement. A 12 mm robotic trocar was inserted about 10 cm to the left and 7 cm lower of the previously placed port. Two 8 mm robotic trocars were carefully placed under direct visualization in the left upper quadrant. An additional 8 mm Air Seal port was placed in the right upper quadrant. A 5 mm tunnel was created distal and left lateral to the xiphoid, a Anuj liver retractor advanced, and the liver retracted. A bilateral abdominal tap block was performed using Marcaine. The patient was placed in 20 degrees of reverse Trendelenberg and 6 degrees of right side tilt. The robot was docked. The robotic instruments were carefully inserted under direct visualization. A general survey was performed and no adhesions were seen. There was a small hiatal hernia and was left intact. Attention was turned to the stomach. The phrenogastric ligament was carefully divided. The cardia of the stomach where the lower edge of the pouch was delineated by dissecting posterior to the stomach. Next, the stomach pouch was created by dividing it from the lower stomach using sequential firings the Sure Form stapler with blue and white loads using a 42 Fr blunt bougie for calibration. Attention was turned to the omentum which was retracted cephalad and then divided with the Vessel Sealer. The transverse colon and the ligament of Treitz were identified.  The small bowel was measured approximately 50 cm distal to the ligament of Treitz and brought up antecolic and checked for length. The Shey limb was found to adequately reach the level of the gastric pouch in an omega loop configuration. Two 2-0 Vicryl stay sutures were placed from the gastric pouch to the shey limb. These were elevated and a gastrostomy and enterotomy was made in between the stay sutures using a hook cautery. A posterior gastroenterotomy was created by firing a Sure Form blue load stapler. The stay sutures were used to close the common enterotomy in two layers. We then created a window in the mesentery of the small bowel and divided the shey limb near the gastrojejunostomy to avoid candy cane syndrome using white loaded Sure Form stapler. Next, the Shey limb was measured to approximately 120 cm and a stapled jejunojejunostomy created with the biliopancreatic limb using multiple loads of the Sure Form white loaded stapler. The resulting mesenteric defect was closed with a running 2-0 Silk suture. Holding distal obstruction with a clamp, an EGD was advanced and a leak test perfomed with inflation while holding the gastrojejunostomy under sterile saline. Neither a leak nor obstruction were observed. Normal stomach and jejunal mucosa were observed without staple line bleeding. Next, the EGD was removed while suctioning. The gastrojejunostomy was observed and found to be tension free. The intraperitoneal saline was aspirated. Hemostasis was assured. All robotic instruments were removed and the robot was undocked. The liver retractor was removed under direct vision. VistaSeal was placed over the gastrojejunostomy and the remnant stomach staple line. The fascia of the 12 mm port sites was closed using 0-Vicryl ties and an endoclosure device. Pneumoperitoneum was evacuated and all trocars were removed. Care was taken to observe there was no bleeding at the trocar sites. The 15 mm port site was irrigated with Betadine. The wounds were all cleansed, dried, and closed with 4-0 Monocryl subcuticular suture. Dermabond was applied over the incisions. All counts were reported as correct. The patient tolerated the procedure well and there were no immediate complications. Disposition: the patient was awakened from anesthesia and transferred to the PACU in stable condition.          Signed by: Kailash Orellana MD  [facility] - Bariatric & Minimally Invasive Surgery  9/13/2021 10:37 AM

## 2021-09-13 NOTE — PROGRESS NOTES
General Surgery Post-op Note    Patient: Frank Abdullahi  MRN: [id number]  Date: 9/13/2021 1:54 PM  Procedure: Robotic assisted gastric bypass    Subjective:     Frank Abdullahi is a 31 y.o. female s/p gastric bypass completed today by Dr. Fina Dewey. She reports that she is stable. She admits to upper abdominal pain and incision site pain, as well as some nausea without vomiting. She denies chest pain, shortness of breath, or lower extremity pain. She has been voiding, OOB ambulating, using the incentive spirometer and tolerating some PO intake. Objective:     Visit Vitals  /69 (BP Patient Position: Lying)   Pulse 91   Temp 97.6 °F (36.4 °C)   Resp 18   Ht 5' 7\" (1.702 m)   Wt 297 lb (134.7 kg)   SpO2 98%   BMI 46.52 kg/m²       Intake/Output Summary (Last 24 hours) at 9/13/2021 1546  Last data filed at 9/13/2021 1054  Gross per 24 hour   Intake 2572.5 ml   Output 75 ml   Net 2497.5 ml        Exam:  General: Alert, oriented, cooperative in no acute distress. Resp:  Breathing is non-labored. CV: Regular rate and rhythm. Abd: Soft, not distended. Incision sites are dry, intact, without presence of erythema, infection, or allergic reaction. Plan:   Patient will remain in the hospital overnight. Discussed the use of incentive spirometer, early ambulation, and PO intake. Reviewed important signs and symptoms including chest pain, shortness of breath, increasing abdominal pain. All of their questions were answered, and the patient is happy with this plan. Plan for discharge 1-2 days depending upon symptoms and progress.      Hospital Problems  Never Reviewed        Codes Class Noted POA    [facility] ICD-10-CM: E66.01  ICD-9-CM: 278.01  6/8/2021 Unknown              Anyi Johnson PA-C  Date: 9/13/2021    Counseling time:counseling time more than 50% of visit: 20 minutes: I spent this time preparing to see patient (including chart review and preparation), obtaining and/or reviewing additional medical history, performing a physical exam and evaluation, documenting clinical information in the electronic health record, independently interpreting results, communicating results to patient, family or caregiver, and/or coordinating care.

## 2021-09-13 NOTE — ANESTHESIA POSTPROCEDURE EVALUATION
Procedure(s):  ERAS/ GASTRIC BYPASS ROBOTIC ASSISTED  ESOPHAGOGASTRODUODENOSCOPY (EGD).     general    Anesthesia Post Evaluation      Multimodal analgesia: multimodal analgesia used between 6 hours prior to anesthesia start to PACU discharge  Patient location during evaluation: PACU  Patient participation: complete - patient participated  Level of consciousness: awake and alert  Pain score: 0  Pain management: adequate  Airway patency: patent  Anesthetic complications: no  Cardiovascular status: acceptable and hemodynamically stable  Respiratory status: acceptable and spontaneous ventilation  Hydration status: acceptable  Post anesthesia nausea and vomiting:  none  Final Post Anesthesia Temperature Assessment:  Normothermia (36.0-37.5 degrees C)      INITIAL Post-op Vital signs:   Vitals Value Taken Time   /57 09/13/21 1141   Temp 36.8 °C (98.2 °F) 09/13/21 1051   Pulse 90 09/13/21 1141   Resp 16 09/13/21 1141   SpO2 98 % 09/13/21 1141

## 2021-09-13 NOTE — PROGRESS NOTES
09/13/21 1507   Oxygen Therapy   O2 Sat (%) 99 %   O2 Device Nasal cannula   O2 Flow Rate (L/min) 1 l/min   Incentive Spirometry Treatment   Actual Volume (ml) 500 ml   Patient encouraged to work on IS 10 times every hour. Patient denies any respiratory distress at this time.  BS diminished but equal. Decreased O2 to 1lpm.

## 2021-09-13 NOTE — PERIOP NOTES
TRANSFER - OUT REPORT:    Verbal report given to [address]  RN on Oseas Gonzales  being transferred to [facility] for routine post - op       Report consisted of patients Situation, Background, Assessment and   Recommendations(SBAR). Information from the following report(s) SBAR was reviewed with the receiving nurse. Opportunity for questions and clarification was provided.       Patient transported with:   O2 @ 3 liters  Tech

## 2021-09-13 NOTE — ANESTHESIA PREPROCEDURE EVALUATION
Relevant Problems   RESPIRATORY SYSTEM   (+) SHANTA (obstructive sleep apnea)      ENDOCRINE   (+) Morbid obesity (HCC)       Anesthetic History   No history of anesthetic complications            Review of Systems / Medical History  Patient summary reviewed and pertinent labs reviewed    Pulmonary        Sleep apnea: No treatment           Neuro/Psych   Within defined limits           Cardiovascular                  Exercise tolerance: >4 METS     GI/Hepatic/Renal  Within defined limits              Endo/Other        Morbid obesity     Other Findings              Physical Exam    Airway  Mallampati: II  TM Distance: 4 - 6 cm         Cardiovascular    Rhythm: regular  Rate: normal      Pertinent negatives: No murmur   Dental  No notable dental hx       Pulmonary  Breath sounds clear to auscultation               Abdominal         Other Findings            Anesthetic Plan    ASA: 3  Anesthesia type: general          Induction: Intravenous  Anesthetic plan and risks discussed with: Patient      ALBERTO Linares

## 2021-09-13 NOTE — PROGRESS NOTES
Problem: Falls - Risk of  Goal: *Absence of Falls  Description: Document Amanda Quintana Fall Risk and appropriate interventions in the flowsheet.   Outcome: Progressing Towards Goal  Note: Fall Risk Interventions:  Mobility Interventions: Bed/chair exit alarm         Medication Interventions: Bed/chair exit alarm                   Problem: Gastric Sleeve Pathway / Bariatric Revision Pathway: Day of Surgery  Goal: Activity/Safety  Outcome: Progressing Towards Goal  Goal: Consults, if ordered  Outcome: Progressing Towards Goal  Goal: Diagnostic Test/Procedures  Outcome: Progressing Towards Goal  Goal: Nutrition/Diet  Outcome: Progressing Towards Goal  Goal: Medications  Outcome: Progressing Towards Goal  Goal: Respiratory  Outcome: Progressing Towards Goal  Goal: Treatments/Interventions/Procedures  Outcome: Progressing Towards Goal  Goal: Psychosocial  Outcome: Progressing Towards Goal  Goal: *No signs and symptoms of infection or wound complications  Outcome: Progressing Towards Goal  Goal: *Optimal pain control at patient's stated goal  Outcome: Progressing Towards Goal  Goal: *Adequate urinary output (equal to or greater than 30 milliliters/hour)  Outcome: Progressing Towards Goal  Goal: *Hemodynamically stable  Outcome: Progressing Towards Goal  Goal: *Tolerating diet  Outcome: Progressing Towards Goal  Goal: *Demonstrates progressive activity  Outcome: Progressing Towards Goal  Goal: *Absence of signs and symptoms of DVT  Outcome: Progressing Towards Goal  Goal: *Labs within defined limits  Outcome: Progressing Towards Goal  Goal: *Oxygen saturation within defined limits  Outcome: Progressing Towards Goal

## 2021-09-13 NOTE — PROGRESS NOTES
TRANSFER - IN REPORT:    Verbal report received from [address] [place] [address] [employer]  being received from PACU for routine post - op      Report consisted of patients Situation, Background, Assessment and   Recommendations(SBAR). Information from the following report(s) SBAR, Kardex, OR Summary, Intake/Output, MAR and Recent Results was reviewed with the receiving nurse. Opportunity for questions and clarification was provided.

## 2021-09-14 VITALS
RESPIRATION RATE: 17 BRPM | WEIGHT: 293 LBS | BODY MASS INDEX: 45.99 KG/M2 | HEART RATE: 79 BPM | SYSTOLIC BLOOD PRESSURE: 124 MMHG | TEMPERATURE: 98.7 F | HEIGHT: 67 IN | OXYGEN SATURATION: 94 % | DIASTOLIC BLOOD PRESSURE: 83 MMHG

## 2021-09-14 LAB
ANION GAP SERPL CALC-SCNC: 7 MMOL/L (ref 7–16)
BUN SERPL-MCNC: 6 MG/DL (ref 6–23)
CALCIUM SERPL-MCNC: 8.7 MG/DL (ref 8.3–10.4)
CHLORIDE SERPL-SCNC: 107 MMOL/L (ref 98–107)
CO2 SERPL-SCNC: 26 MMOL/L (ref 21–32)
CREAT SERPL-MCNC: 0.72 MG/DL (ref 0.6–1)
ERYTHROCYTE [DISTWIDTH] IN BLOOD BY AUTOMATED COUNT: 12.6 % (ref 11.9–14.6)
GLUCOSE SERPL-MCNC: 114 MG/DL (ref 65–100)
HCT VFR BLD AUTO: 34 % (ref 35.8–46.3)
HGB BLD-MCNC: 11 G/DL (ref 11.7–15.4)
MCH RBC QN AUTO: 28.6 PG (ref 26.1–32.9)
MCHC RBC AUTO-ENTMCNC: 32.4 G/DL (ref 31.4–35)
MCV RBC AUTO: 88.3 FL (ref 79.6–97.8)
NRBC # BLD: 0 K/UL (ref 0–0.2)
PLATELET # BLD AUTO: 245 K/UL (ref 150–450)
PMV BLD AUTO: 9.3 FL (ref 9.4–12.3)
POTASSIUM SERPL-SCNC: 4.2 MMOL/L (ref 3.5–5.1)
RBC # BLD AUTO: 3.85 M/UL (ref 4.05–5.2)
SODIUM SERPL-SCNC: 140 MMOL/L (ref 136–145)
WBC # BLD AUTO: 11.9 K/UL (ref 4.3–11.1)

## 2021-09-14 PROCEDURE — 85027 COMPLETE CBC AUTOMATED: CPT

## 2021-09-14 PROCEDURE — 97161 PT EVAL LOW COMPLEX 20 MIN: CPT

## 2021-09-14 PROCEDURE — 74011250637 HC RX REV CODE- 250/637: Performed by: NURSE PRACTITIONER

## 2021-09-14 PROCEDURE — 74011250637 HC RX REV CODE- 250/637: Performed by: PHYSICIAN ASSISTANT

## 2021-09-14 PROCEDURE — 74011250636 HC RX REV CODE- 250/636: Performed by: PHYSICIAN ASSISTANT

## 2021-09-14 PROCEDURE — 36415 COLL VENOUS BLD VENIPUNCTURE: CPT

## 2021-09-14 PROCEDURE — 80048 BASIC METABOLIC PNL TOTAL CA: CPT

## 2021-09-14 PROCEDURE — 97110 THERAPEUTIC EXERCISES: CPT

## 2021-09-14 PROCEDURE — APPNB30 APP NON BILLABLE TIME 0-30 MINS: Performed by: PHYSICIAN ASSISTANT

## 2021-09-14 PROCEDURE — 74011000250 HC RX REV CODE- 250: Performed by: PHYSICIAN ASSISTANT

## 2021-09-14 PROCEDURE — APPNB15 APP NON BILLABLE TIME 0-15 MINS: Performed by: PHYSICIAN ASSISTANT

## 2021-09-14 RX ORDER — PANTOPRAZOLE SODIUM 40 MG/1
40 TABLET, DELAYED RELEASE ORAL
Status: DISCONTINUED | OUTPATIENT
Start: 2021-09-14 | End: 2021-09-14 | Stop reason: HOSPADM

## 2021-09-14 RX ORDER — SUMATRIPTAN 50 MG/1
50 TABLET, FILM COATED ORAL
Status: COMPLETED | OUTPATIENT
Start: 2021-09-14 | End: 2021-09-14

## 2021-09-14 RX ADMIN — KETOROLAC TROMETHAMINE 15 MG: 30 INJECTION, SOLUTION INTRAMUSCULAR; INTRAVENOUS at 04:58

## 2021-09-14 RX ADMIN — GABAPENTIN 300 MG: 300 CAPSULE ORAL at 08:27

## 2021-09-14 RX ADMIN — ONDANSETRON 4 MG: 2 INJECTION INTRAMUSCULAR; INTRAVENOUS at 00:33

## 2021-09-14 RX ADMIN — SUCRALFATE 1 G: 1 TABLET ORAL at 12:04

## 2021-09-14 RX ADMIN — SUMATRIPTAN SUCCINATE 50 MG: 50 TABLET ORAL at 08:54

## 2021-09-14 RX ADMIN — ONDANSETRON 4 MG: 2 INJECTION INTRAMUSCULAR; INTRAVENOUS at 04:58

## 2021-09-14 RX ADMIN — KETOROLAC TROMETHAMINE 15 MG: 30 INJECTION, SOLUTION INTRAMUSCULAR; INTRAVENOUS at 00:31

## 2021-09-14 RX ADMIN — HEPARIN SODIUM 5000 UNITS: 5000 INJECTION INTRAVENOUS; SUBCUTANEOUS at 04:59

## 2021-09-14 RX ADMIN — ACETAMINOPHEN 1000 MG: 500 TABLET, FILM COATED ORAL at 12:04

## 2021-09-14 RX ADMIN — TRAMADOL HYDROCHLORIDE 50 MG: 50 TABLET, FILM COATED ORAL at 08:27

## 2021-09-14 RX ADMIN — ACETAMINOPHEN 1000 MG: 500 TABLET, FILM COATED ORAL at 08:27

## 2021-09-14 RX ADMIN — PANTOPRAZOLE SODIUM 40 MG: 40 TABLET, DELAYED RELEASE ORAL at 08:54

## 2021-09-14 RX ADMIN — SUCRALFATE 1 G: 1 TABLET ORAL at 08:27

## 2021-09-14 RX ADMIN — HEPARIN SODIUM 5000 UNITS: 5000 INJECTION INTRAVENOUS; SUBCUTANEOUS at 12:04

## 2021-09-14 RX ADMIN — PROMETHAZINE HYDROCHLORIDE 12.5 MG: 25 INJECTION INTRAMUSCULAR; INTRAVENOUS at 08:54

## 2021-09-14 RX ADMIN — ACETAMINOPHEN 1000 MG: 500 TABLET, FILM COATED ORAL at 00:35

## 2021-09-14 NOTE — PROGRESS NOTES
Bariatric Surgery Daily Progress Note    Patient: Clarita Prabhakar  MRN: [id number]  Date: 9/14/2021 7:20 AM  Admit Date: 9/13/2021  Procedure: Robotic assisted gastric bypass    Subjective:     Clarita Prabhakar is a 31 y.o. female who is POD #1 s/p gastric bypass completed by Dr. Ruddy Fan. She reports that she is feeling better today compared to yesterday. She admits to mild incisional pain, and denies nausea, vomiting or dry heaving. She has been OOB ambulating, voiding without difficulty, tolerating clear PO intake and using the incentive spirometer. Last night, her IV infiltrated which was removed and she has been reliant on PO intake only. She has not yet tried a protein shake.      Objective:     MEDS:    Current Facility-Administered Medications   Medication    scopolamine (TRANSDERM-SCOP) 1 mg over 3 days 1 Patch    acetaminophen (TYLENOL) tablet 1,000 mg    gabapentin (NEURONTIN) capsule 300 mg    HYDROmorphone (DILAUDID) injection 0.5 mg    naloxone (NARCAN) injection 0.4 mg    ketorolac (TORADOL) injection 15 mg    fat emulsion 20% (LIPOSYN, INTRAlipid) infusion    ondansetron (ZOFRAN) injection 4 mg    heparin (porcine) injection 5,000 Units    diphenhydrAMINE (BENADRYL) injection 12.5 mg    promethazine (PHENERGAN) with saline injection 12.5 mg    sucralfate (CARAFATE) tablet 1 g    0.9% sodium chloride with KCl 20 mEq/L infusion    pantoprazole (PROTONIX) 40 mg in 0.9% sodium chloride 10 mL injection    traMADoL (ULTRAM) tablet 50 mg       ALLERGIES:       No Known Allergies    I/O:      Intake/Output Summary (Last 24 hours) at 9/14/2021 0805  Last data filed at 9/13/2021 1054  Gross per 24 hour   Intake 2572.5 ml   Output 75 ml   Net 2497.5 ml           Physical Exam:     Visit Vitals  /71 (BP 1 Location: Left upper arm, BP Patient Position: At rest;Supine)   Pulse 84   Temp 98.7 °F (37.1 °C)   Resp 17   Ht 5' 7\" (1.702 m)   Wt 297 lb (134.7 kg)   SpO2 99%   BMI 46.52 kg/m²       General:  Alert, oriented, cooperative in no acute distress. Neuro:  Alert, oriented to person, place and time. Lungs:  Unlabored breathing. Symmetrical chest expansion. Heart: Regular rate and rhythm. Abdomen:  Soft, appropriately tender at incision sites. Lap incisions C/D/I without presence of erythema, infection, or allergic reaction. Abdominal binder in place. Extremities:  Extremities normal, atraumatic, no cyanosis or edema. Labs: All recent labs were reviewed. Recent Results (from the past 24 hour(s))   CBC W/O DIFF    Collection Time: 09/14/21  3:40 AM   Result Value Ref Range    WBC 11.9 (H) 4.3 - 11.1 K/uL    RBC 3.85 (L) 4.05 - 5.2 M/uL    HGB 11.0 (L) 11.7 - 15.4 g/dL    HCT 34.0 (L) 35.8 - 46.3 %    MCV 88.3 79.6 - 97.8 FL    MCH 28.6 26.1 - 32.9 PG    MCHC 32.4 31.4 - 35.0 g/dL    RDW 12.6 11.9 - 14.6 %    PLATELET 183 863 - 966 K/uL    MPV 9.3 (L) 9.4 - 12.3 FL    ABSOLUTE NRBC 0.00 0.0 - 0.2 K/uL   METABOLIC PANEL, BASIC    Collection Time: 09/14/21  3:40 AM   Result Value Ref Range    Sodium 140 136 - 145 mmol/L    Potassium 4.2 3.5 - 5.1 mmol/L    Chloride 107 98 - 107 mmol/L    CO2 26 21 - 32 mmol/L    Anion gap 7 7 - 16 mmol/L    Glucose 114 (H) 65 - 100 mg/dL    BUN 6 6 - 23 MG/DL    Creatinine 0.72 0.6 - 1.0 MG/DL    GFR est AA >60 >60 ml/min/1.73m2    GFR est non-AA >60 >60 ml/min/1.73m2    Calcium 8.7 8.3 - 10.4 MG/DL       Imaging:   No results found. Assessment/Plan:   Jennifer Morales is a 31 y.o. female s/p Robotic assisted gastric bypass    Pain control   DIET ADULT ORAL NUTRITION SUPPLEMENT  DIET BARIATRIC - clear with protein  Lap incisions C/D/I  No tachycardia overnight. Labs reviewed. Abdominal binder for comfort  OOB and ambulate as tolerated. PT consulted. DVT proph - SCDs/Heparin  Discharge to home today after trial of protein shake.     Jennie Hernandez PA-C  Date: 9/14/2021    Counseling time:counseling time more than 50% of visit: 30 minutes: I spent this time preparing to see patient (including chart review and preparation), obtaining and/or reviewing additional medical history, performing a physical exam and evaluation, documenting clinical information in the electronic health record, independently interpreting results, communicating results to patient, family or caregiver, and/or coordinating care.

## 2021-09-14 NOTE — PROGRESS NOTES
NUTRITION REASSESSMENT    Assessment:  Visited pt on POD # 1 s/p RYGB. Pt reports she is tolerating CL diet. She is experiencing some nausea but denies vomiting. She denies fever/chills/SOB/ unusual pain. She admits to a headache and chest pain as well as severe abd pain when moving. Previous Nutrition Diagnosis: Morbid obesity R/T hx yoyo dietiting and sedentary lifestyle as evidenced by BMI = 46.52 and 220 % IBW.  --ongoing, modified--BMI and %IBW adjusted to reflect weight loss--     Intervention:   1. Reminded pt of diet stage upon discharge; full liquids (no solid food)  2. Encouraged protein first focus, reminded pt of 60 g /d protein goal  a. Emphasized importance of using protein shakes to meet protein goals  3. Encouraged pt to sip fluids throughout the day to stay hydrated and reach >64 oz/d goal.  4. Encouraged slow, small sips, reminded pt to sit upright when eating and drinking. 5. Encouraged light exercise; walking, PT exercises  6. Reminded pt to begin MVI supplements  a. Encouraged pt to take supplements one at a time  7. Referred pt to program manual for post-operative trouble shooting tips and additional information. 8. Encouraged pt to call main office with any medical questions or concerns. Monitoring and Evaluation:  1. Monitor for continued safe, supervised weight loss for RYGB.     2. F/U per MD Doreen Copeland, RD, LD

## 2021-09-14 NOTE — PROGRESS NOTES
Patient reported anxiety nurse offer to call doctor about med. Pt stated she did not want to use any anxiety med while in the hospital. Pt IV infiltrated nurse removed tip intact no s/sx of infection pt tolerated well. Nurse attempted to [address] per policy. Nursing supervisor notified and visit the patient. Pt informed nursing supervisor that usually has a central line during hospital stays. Pt reqeusted all IV meds be given IM.  On call doctor notified and respond ok

## 2021-09-14 NOTE — PROGRESS NOTES
Problem: Falls - Risk of  Goal: *Absence of Falls  Description: Document Vilma Cash Fall Risk and appropriate interventions in the flowsheet.   Outcome: Progressing Towards Goal  Note: Fall Risk Interventions:  Mobility Interventions: Bed/chair exit alarm         Medication Interventions: Bed/chair exit alarm                   Problem: Gastric Sleeve Pathway / Bariatric Revision Pathway: Post-Op Day 1  Goal: Activity/Safety  Outcome: Progressing Towards Goal  Goal: Diagnostic Test/Procedures  Outcome: Progressing Towards Goal  Goal: Nutrition/Diet  Outcome: Progressing Towards Goal  Goal: Discharge Planning  Outcome: Progressing Towards Goal  Goal: Medications  Outcome: Progressing Towards Goal  Goal: Respiratory  Outcome: Progressing Towards Goal  Goal: Treatments/Interventions/Procedures  Outcome: Progressing Towards Goal  Goal: Psychosocial  Outcome: Progressing Towards Goal  Goal: *No signs and symptoms of infection or wound complications  Outcome: Progressing Towards Goal  Goal: *Optimal pain control at patient's stated goal  Outcome: Progressing Towards Goal  Goal: *Adequate urinary output (equal to or greater than 30 milliliters/hour)  Outcome: Progressing Towards Goal  Goal: *Hemodynamically stable  Outcome: Progressing Towards Goal  Goal: *Tolerating diet  Outcome: Progressing Towards Goal  Goal: *Demonstrates progressive activity  Outcome: Progressing Towards Goal  Goal: *Absence of signs and symptoms of DVT  Outcome: Progressing Towards Goal  Goal: *Labs within defined limits  Outcome: Progressing Towards Goal  Goal: *Oxygen saturation within defined limits  Outcome: Progressing Towards Goal

## 2021-09-14 NOTE — PROGRESS NOTES
Discharge instructions provided to pt. Chance given to ask questions and answers provided. IV removed, tip intact. Waiting to be wheeled downstairs.

## 2021-09-14 NOTE — PROGRESS NOTES
Pt resting in bed with eyes closed pain meds x 3 this shift.  Pt bed low/locked call light and personal items in reach

## 2021-09-14 NOTE — PROGRESS NOTES
Problem: Mobility Impaired (Adult and Pediatric)  Goal: *Acute Goals and Plan of Care (Insert Text)  Note: STG:  (1.)Ms. Tereza Joe will {be independent with HEP within 1-4 treatment days. ________________________________________________________________________________________________       PHYSICAL THERAPY: Initial Assessment and AM 9/14/2021  INPATIENT: PT Visit Days : 1  Payor: Jalen / Plan: [address] / Product Type: Managed Care Medicaid /       NAME/AGE/GENDER: Stanislav Miller is a 31 y.o. female   PRIMARY DIAGNOSIS: [facility]bid [facility] [E66.01] <principal problem not specified> <principal problem not specified>  Procedure(s) (LRB):  ERAS/ GASTRIC BYPASS ROBOTIC ASSISTED (N/A)  ESOPHAGOGASTRODUODENOSCOPY (EGD) (N/A)  1 Day Post-Op  ICD-10: Treatment Diagnosis:    Generalized Muscle Weakness (M62.81)   Precaution/Allergies:  Patient has no known allergies. ASSESSMENT:     Ms. Tereza Joe presents s/p above diagnosis/surgery and will benefit from PT for HEP education and post op mobility. Pt lives at home with her fiance and was independent with mobility without assistive devices prior to admission. She plans to return home. Pt has walked already and feels like she has been doing well. She is independent with mobility and has no dizziness or loss of balance. In room we worked on her HEP. Pt did well and demonstrates and verbalizes understanding of frequency. She hopes to go home today.      This section established at most recent assessment   PROBLEM LIST (Impairments causing functional limitations):  Decreased [place] with Home Exercise Program   INTERVENTIONS PLANNED: (Benefits and precautions of physical therapy have been discussed with the patient.)  Home Exercise Program (HEP)     TREATMENT PLAN: Frequency/Duration: daily for duration of hospital stay  Rehabilitation Potential For Stated Goals: Good     REHAB RECOMMENDATIONS (at time of discharge pending progress): Placement: It is my opinion, based on this patient's performance to date, [facility] participating in 1-2 additional therapy sessions in order to continue to assess for rehab progress and then discharge with no follow up recommended. Equipment:   None at this time              HISTORY:   History of Present Injury/Illness (Reason for Referral):  Pt s/p above surgery  Past Medical History/Comorbidities:   Ms. [facility]  has a past medical history of Anxiety, Back pain, History of COVID-19 (07/2020), History of palpitations, Migraine, Morbid obesity ([address] and SHANTA (obstructive sleep apnea). Ms. [facility]  has a past surgical history that includes hx wisdom teeth extraction. Social History/Living Environment:   Home Environment: Private residence  One/Two Story Residence: One story  Living Alone: No  Support Systems: Spouse/Significant Other, Other Family Member(s)  Patient Expects to be Discharged to[de-identified] House  Current DME Used/Available at Home: None  Prior Level of Function/Work/Activity:  Independent without assistive devices     Number of Personal Factors/Comorbidities that affect the Plan of Care: 0: LOW COMPLEXITY   EXAMINATION:   Most Recent Physical Functioning:   Gross Assessment:  AROM: Within functional limits  Strength: Within functional limits               Posture:  Posture (WDL): Within defined limits  Balance:  Sitting: Intact  Standing: Intact Bed Mobility:  Supine to Sit: Independent  Wheelchair Mobility:     Transfers:  Sit to Stand: Independent  Stand to Sit: Independent  Gait:            Body Structures Involved:  Muscles Body Functions Affected: Movement Related Activities and Participation Affected:   Mobility   Number of elements that affect the Plan of Care: 3: MODERATE COMPLEXITY   CLINICAL PRESENTATION:   Presentation: Stable and uncomplicated: LOW COMPLEXITY   CLINICAL DECISION MAKING:   [employer] [address] [place] [address] Mobility Inpatient Short Form  How much difficulty does the patient currently have. .. Unable A Lot A Little None   1. Turning over in bed (including adjusting bedclothes, sheets and blankets)? [] 1   [] 2   [] 3   [x] 4   2. Sitting down on and standing up from a chair with arms ( e.g., wheelchair, bedside commode, etc.)   [] 1   [] 2   [] 3   [x] 4   3. Moving from lying on back to sitting on the side of the bed? [] 1   [] 2   [] 3   [x] 4   How much help from another person does the patient currently need. .. Total A Lot A Little None   4. Moving to and from a bed to a chair (including a wheelchair)? [] 1   [] 2   [] 3   [x] 4   5. Need to walk in hospital room? [] 1   [] 2   [] 3   [x] 4   6. Climbing 3-5 steps with a railing? [] 1   [] 2   [] 3   [x] 4   © 2007, Trustees of [facility] [employer], under license to [employer]. All rights reserved      Score:  Initial: 24 Most Recent: X (Date: -- )    Interpretation of Tool:  Represents activities that are increasingly more difficult (i.e. Bed mobility, Transfers, Gait). Medical Necessity:     Patient is expected to demonstrate progress in   strength   to   decrease assistance required with HEP  . Reason for Services/Other Comments:  Post op  mobility and HEP education. Use of outcome tool(s) and clinical judgement create a POC that gives a: Clear prediction of patient's progress: LOW COMPLEXITY            TREATMENT:   (In addition to Assessment/Re-Assessment sessions the following treatments were rendered)   Pre-treatment Symptoms/Complaints:  none  Pain: Initial:   Pain Intensity 1: 0  Post Session:  0/10     Therapeutic Exercise: (8 Minutes):  Exercises per grid below to improve mobility and strength. Required minimal verbal cues to promote proper body alignment and promote proper body posture. Progressed repetitions as indicated.      Date:  9/14 Date:   Date:     Activity/Exercise Parameters Parameters Parameters   Ankle pumps 10     Long arc quads 10     Seated hip flexion 10     Elbow flexion 10     Shoulder flexion 10                     Braces/Orthotics/Lines/Etc:   O2 Device: None (Room air)  Treatment/Session Assessment:    Response to Treatment:  pt doing well, hopes to go home today  Interdisciplinary Collaboration:   Registered Nurse  After treatment position/precautions:   Up in chair   Compliance with Program/Exercises: Compliant all of the time  Recommendations/Intent for next treatment session: \"Next visit will focus on advancements to more challenging activities and reduction in assistance provided\".   Total Treatment Duration:  PT Patient Time In/Time Out  Time In: 1150  Time Out: Jin Santana PT

## 2021-09-14 NOTE — DISCHARGE INSTRUCTIONS
General Instructions  No bathtub or pool for 2 weeks. Ok to shower. No weight lifting greater than 20 lbs for 6 weeks. Leave the skin glue in place. It will fall off on its own in 7-10 days. Diet  Take 30 cc (1 oz) of water or protein every 30 minutes. You should be drinking about 64 ounces of fluids a day to prevent dehydration. You should be consuming about 60-80 grams of protein a day to allow for good wound healing and healthy weight loss. Medications  Take tylenol as needed for mild pain every 4-6 hours. Percocet prescribed for mod to severe pain. This is a narcotic and can cause drowsiness, nausea and vomiting and constipation. Take Colace 100mg BID (over the counter) if constipated. Take Protonix 40 mg daily for 3 months. Take Carafate 10 mL by mouth three (3) times daily for 30 days. Take Zofran 8 mg tablet as needed for nausea every 8 hours. Follow Up  Follow in bariatric clinic with Bro Byrnes MD in 10 days. Your appointment should be already scheduled but if not, please call and make an appointment. Patient Education        Gastric Bypass Surgery: What to Expect at Home  Your Recovery  A gastric bypass (also called Shey-en-Y gastric bypass) is surgery to make the stomach smaller and change the connection between the stomach and the intestines. It is done to help people lose weight. The surgery limits the amount of food the stomach can hold. This helps you eat less and feel full sooner. The cuts (incisions) the doctor made in your belly will probably be sore for several days to several weeks, depending on whether you had a laparoscopic or open surgery. If you have stitches, the doctor will take these out at your follow-up visit. You probably will lose weight very quickly in the first few months after surgery. As time goes on, your weight loss will slow down. You can expect most of your weight loss to happen in the first 12 months after your surgery.  You will have regular doctor's appointments during this time to check how you are doing. It is important to think of this surgery as a tool to help you lose weight. It is not an instant fix. You will still need to eat a healthy diet and get regular exercise. This will help you reach your weight goal and avoid regaining the weight you lose. It is common to have many different emotions after this surgery. You may feel happy or excited as you begin to lose weight. But you may also feel overwhelmed or frustrated by the changes that you have to make in your diet, activity, and lifestyle. Talk with your doctor if you have concerns or questions. This care sheet gives you a general idea about how long it will take for you to recover. But each person recovers at a different pace. Follow the steps below to get better as quickly as possible. How can you care for yourself at home? Activity    · Rest when you feel tired. Getting enough sleep will help you recover.     · Try to walk each day. Start by walking a little more than you did the day before. Bit by bit, increase the amount you walk. Walking boosts blood flow and helps prevent pneumonia and constipation.     · Avoid strenuous activities, such as bicycle riding, jogging, weight lifting, or aerobic exercise, until your doctor says it is okay.     · Until your doctor says it is okay, avoid lifting anything that would make you strain. This may include a child, heavy grocery bags and milk containers, a heavy briefcase or backpack, cat litter or dog food bags, or a vacuum .     · Hold a pillow over your incisions when you cough or take deep breaths. This will support your belly and decrease your pain.     · Do breathing exercises at home as instructed by your doctor. This will help prevent pneumonia.     · Ask your doctor when you can drive again.     · You will probably need to take 2 to 4 weeks off from work. It depends on the type of work you do and how you feel.  You will probably return to normal activities within 3 to 5 weeks.     · You may shower, if your doctor okays it. Pat the incisions dry. Do not take a bath for the first 2 weeks, or until your doctor tells you it is okay.     · Ask your doctor when it is okay for you to have sex. Diet    · Your doctor will give you specific instructions about what to eat after the surgery. For the first 2 to 6 weeks, you will need to follow a liquid or soft diet. Bit by bit, you will be able to add solid foods back into your diet.     · Your doctor may recommend that you work with a dietitian to plan healthy meals that give you enough protein, vitamins, and minerals while you are losing weight. Even with a healthy diet, you probably will need to take vitamin and mineral supplements for the rest of your life.     · At first you may feel full after just a few sips of water or other liquid. It is important to try to sip water throughout the day to avoid becoming dehydrated.     · You may notice that your bowel movements are not regular right after your surgery. This is common. Try to avoid constipation and straining with bowel movements.     · Sometimes the stomach empties food into the small intestine too quickly. This is called dumping syndrome. It can cause diarrhea and make you feel faint, shaky, and nauseated. It also can make it hard for your body to get enough nutrition. ? High-sugar foods--such as desserts, soda pop, and fruit juices--are most likely to cause dumping syndrome. Avoid high-sugar foods, or use products that have artificial sweeteners if sugar gives you a problem. ? Do not drink liquids within a half hour before eating and up to an hour after eating. Liquids move food even more quickly into the small intestine. Quick emptying of the stomach increases the chance of diarrhea. ? Eat slowly. Try to chew each bite about 20 times. Allow 20 to 30 minutes for each meal.  ? Eat 5 or 6 small meals or snacks a day.  This may keep you from feeling too full after eating and may reduce problems with diarrhea and dumping syndrome. Medicines    · Your doctor will tell you if and when you can restart your medicines. He or she will also give you instructions about taking any new medicines.     · If you take aspirin or some other blood thinner, ask your doctor if and when to start taking it again. Make sure that you understand exactly what your doctor wants you to do.     · Be safe with medicines. Take pain medicines exactly as directed. ? If the doctor gave you a prescription medicine for pain, take it as prescribed. ? If you are not taking a prescription pain medicine, ask your doctor if you can take an over-the-counter medicine.     · If you think your pain medicine is making you sick to your stomach:  ? Take your medicine after meals (unless your doctor has told you not to). ? Ask your doctor for a different pain medicine.     · If your doctor prescribed antibiotics, take them as directed. Do not stop taking them just because you feel better. You need to take the full course of antibiotics. Incision care    · If you have strips of tape on the incision(s), leave the tape on for a week or until it falls off.     · Wash the area daily with warm, soapy water, and pat it dry. Don't use hydrogen peroxide or alcohol, which can slow healing. You may cover the area with a gauze bandage if it weeps or rubs against clothing. Change the bandage every day.     · Keep the area clean and dry. Follow-up care is a key part of your treatment and safety. Be sure to make and go to all appointments, and call your doctor if you are having problems. It's also a good idea to know your test results and keep a list of the medicines you take. When should you call for help? Call 911 anytime you think you may need emergency care. For example, call if:    · You passed out (lost consciousness).     · You are short of breath.    Call your doctor now or seek immediate medical care if:    · You have pain that does not get better after you take pain medicine.     · You cannot pass stool or gas.     · You are sick to your stomach and cannot drink fluids.     · You have loose stitches, or your incision comes open.     · You have signs of a blood clot, such as:  ? Pain in your calf, back of the knee, thigh, or groin. ? Redness and swelling in your leg or groin.     · You have signs of infection, such as:  ? Increased pain, swelling, warmth, or redness. ? Red streaks leading from the incision. ? Pus draining from the incision. ? A fever. Watch closely for changes in your health, and be sure to contact your doctor if you have any problems. Where can you learn more? Go to http://shania-troy.info/  Enter Y354 in the search box to learn more about \"Gastric Bypass Surgery: What to Expect at Home. \"  Current as of: September 23, 2020               Content Version: 12.8  © 2006-2021 Healthwise, [facility]. Care instructions adapted under license by [employer] (which disclaims liability or warranty for this information). If you have questions about a medical condition or this instruction, always ask your healthcare professional. [place] [address] any warranty or liability for your use of this information.

## 2021-09-15 ENCOUNTER — HOSPITAL ENCOUNTER (INPATIENT)
Age: 36
LOS: 3 days | Discharge: HOME HEALTH | DRG: 252 | End: 2021-09-18
Attending: SURGERY | Admitting: SURGERY
Payer: COMMERCIAL

## 2021-09-15 DIAGNOSIS — K56.609 SBO (SMALL BOWEL OBSTRUCTION) (HCC): ICD-10-CM

## 2021-09-15 DIAGNOSIS — E66.01 MORBID OBESITY (HCC): ICD-10-CM

## 2021-09-15 LAB
ANION GAP SERPL CALC-SCNC: 7 MMOL/L (ref 7–16)
BUN SERPL-MCNC: 8 MG/DL (ref 6–23)
CALCIUM SERPL-MCNC: 9.1 MG/DL (ref 8.3–10.4)
CHLORIDE SERPL-SCNC: 107 MMOL/L (ref 98–107)
CO2 SERPL-SCNC: 25 MMOL/L (ref 21–32)
CREAT SERPL-MCNC: 0.84 MG/DL (ref 0.6–1)
ERYTHROCYTE [DISTWIDTH] IN BLOOD BY AUTOMATED COUNT: 12.7 % (ref 11.9–14.6)
GLUCOSE SERPL-MCNC: 84 MG/DL (ref 65–100)
HCT VFR BLD AUTO: 37.7 % (ref 35.8–46.3)
HGB BLD-MCNC: 12 G/DL (ref 11.7–15.4)
MCH RBC QN AUTO: 28.4 PG (ref 26.1–32.9)
MCHC RBC AUTO-ENTMCNC: 31.8 G/DL (ref 31.4–35)
MCV RBC AUTO: 89.1 FL (ref 79.6–97.8)
NRBC # BLD: 0 K/UL (ref 0–0.2)
PLATELET # BLD AUTO: 166 K/UL (ref 150–450)
PMV BLD AUTO: 10.1 FL (ref 9.4–12.3)
POTASSIUM SERPL-SCNC: 3.7 MMOL/L (ref 3.5–5.1)
RBC # BLD AUTO: 4.23 M/UL (ref 4.05–5.2)
SODIUM SERPL-SCNC: 139 MMOL/L (ref 136–145)
WBC # BLD AUTO: 10.3 K/UL (ref 4.3–11.1)

## 2021-09-15 PROCEDURE — APPNB30 APP NON BILLABLE TIME 0-30 MINS: Performed by: PHYSICIAN ASSISTANT

## 2021-09-15 PROCEDURE — APPNB180 APP NON BILLABLE TIME > 60 MINS: Performed by: PHYSICIAN ASSISTANT

## 2021-09-15 PROCEDURE — 65270000029 HC RM PRIVATE

## 2021-09-15 PROCEDURE — 74011250636 HC RX REV CODE- 250/636: Performed by: PHYSICIAN ASSISTANT

## 2021-09-15 PROCEDURE — 74011000250 HC RX REV CODE- 250: Performed by: SURGERY

## 2021-09-15 PROCEDURE — 36415 COLL VENOUS BLD VENIPUNCTURE: CPT

## 2021-09-15 PROCEDURE — 85027 COMPLETE CBC AUTOMATED: CPT

## 2021-09-15 PROCEDURE — C9113 INJ PANTOPRAZOLE SODIUM, VIA: HCPCS | Performed by: SURGERY

## 2021-09-15 PROCEDURE — 80048 BASIC METABOLIC PNL TOTAL CA: CPT

## 2021-09-15 PROCEDURE — 74011250636 HC RX REV CODE- 250/636: Performed by: SURGERY

## 2021-09-15 RX ORDER — SODIUM CHLORIDE 0.9 % (FLUSH) 0.9 %
5-40 SYRINGE (ML) INJECTION AS NEEDED
Status: DISCONTINUED | OUTPATIENT
Start: 2021-09-15 | End: 2021-09-18 | Stop reason: HOSPADM

## 2021-09-15 RX ORDER — HEPARIN SODIUM 5000 [USP'U]/ML
5000 INJECTION, SOLUTION INTRAVENOUS; SUBCUTANEOUS EVERY 8 HOURS
Status: DISCONTINUED | OUTPATIENT
Start: 2021-09-15 | End: 2021-09-18 | Stop reason: HOSPADM

## 2021-09-15 RX ORDER — ONDANSETRON 2 MG/ML
4 INJECTION INTRAMUSCULAR; INTRAVENOUS
Status: DISCONTINUED | OUTPATIENT
Start: 2021-09-15 | End: 2021-09-15

## 2021-09-15 RX ORDER — MORPHINE SULFATE 2 MG/ML
1 INJECTION, SOLUTION INTRAMUSCULAR; INTRAVENOUS
Status: DISCONTINUED | OUTPATIENT
Start: 2021-09-15 | End: 2021-09-16

## 2021-09-15 RX ORDER — SODIUM CHLORIDE 0.9 % (FLUSH) 0.9 %
5-40 SYRINGE (ML) INJECTION EVERY 8 HOURS
Status: DISCONTINUED | OUTPATIENT
Start: 2021-09-15 | End: 2021-09-18 | Stop reason: HOSPADM

## 2021-09-15 RX ORDER — PANTOPRAZOLE SODIUM 40 MG/10ML
40 INJECTION, POWDER, LYOPHILIZED, FOR SOLUTION INTRAVENOUS EVERY 24 HOURS
Status: DISCONTINUED | OUTPATIENT
Start: 2021-09-15 | End: 2021-09-15 | Stop reason: DRUGHIGH

## 2021-09-15 RX ORDER — SODIUM CHLORIDE AND POTASSIUM CHLORIDE .9; .15 G/100ML; G/100ML
SOLUTION INTRAVENOUS CONTINUOUS
Status: DISCONTINUED | OUTPATIENT
Start: 2021-09-15 | End: 2021-09-18 | Stop reason: HOSPADM

## 2021-09-15 RX ORDER — METOCLOPRAMIDE HYDROCHLORIDE 5 MG/ML
10 INJECTION INTRAMUSCULAR; INTRAVENOUS
Status: DISCONTINUED | OUTPATIENT
Start: 2021-09-15 | End: 2021-09-16

## 2021-09-15 RX ADMIN — Medication 10 ML: at 17:05

## 2021-09-15 RX ADMIN — POTASSIUM CHLORIDE AND SODIUM CHLORIDE: 900; 150 INJECTION, SOLUTION INTRAVENOUS at 17:01

## 2021-09-15 RX ADMIN — METOCLOPRAMIDE 10 MG: 5 INJECTION, SOLUTION INTRAMUSCULAR; INTRAVENOUS at 22:59

## 2021-09-15 RX ADMIN — MORPHINE SULFATE 1 MG: 2 INJECTION, SOLUTION INTRAMUSCULAR; INTRAVENOUS at 18:32

## 2021-09-15 RX ADMIN — HEPARIN SODIUM 5000 UNITS: 5000 INJECTION INTRAVENOUS; SUBCUTANEOUS at 17:02

## 2021-09-15 RX ADMIN — Medication 10 ML: at 21:54

## 2021-09-15 RX ADMIN — MORPHINE SULFATE 1 MG: 2 INJECTION, SOLUTION INTRAMUSCULAR; INTRAVENOUS at 22:55

## 2021-09-15 RX ADMIN — METOCLOPRAMIDE 10 MG: 5 INJECTION, SOLUTION INTRAMUSCULAR; INTRAVENOUS at 18:32

## 2021-09-15 RX ADMIN — SODIUM CHLORIDE 40 MG: 9 INJECTION, SOLUTION INTRAMUSCULAR; INTRAVENOUS; SUBCUTANEOUS at 17:03

## 2021-09-15 NOTE — PROGRESS NOTES
Pt refused to be stuck for peripheral I.V. sent perfect serve message to Dr. Nolberto Fernandez and she said it's ok to use pt's Central line for blood draws and antibiotics till tomorrow when IR can remove pt's line.

## 2021-09-15 NOTE — H&P
H&P    Julio Gandara    MRN: [id number]    :1985    Age:36 y.o.    HPI: Julio Gandara is a 31 y.o. female who is s/p robotic assisted gastric bypass on 2021 with Dr. Nicolas Denton. She was discharged to home on 2021 well appearing and without any post-operative issues. Patient reports that around midnight, she developed acute onset of RLQ pain with associated nausea and vomiting. She presented to [facility] where CT scan was done, and IVF and pain medication were provided. She was transferred to [address] [place] [address] for further management with her surgical team.     Patient reports that her pain is stable since presentation to the emergency department. She continues to have moderate RLQ pain, as well as nausea without any recent episodes of vomiting. She notes that she was able to tolerate liquid PO intake last night. She denies having a bowel movement since discharge, but has been passing flatus yesterday and today throughout the day. She denies fevers, chills, or shortness of breath. She does experience mild epigastric pain, but no chest pain.     Past Medical History:   Diagnosis Date    Anxiety     Back pain     History of COVID-19 2020    SOB, cough, body aches, F, loss of taste & smell    History of palpitations     Migraine     Morbid obesity (HCC)     SHANTA (obstructive sleep apnea)     does not use CPAP but will bring DOS     Past Surgical History:   Procedure Laterality Date    HX WISDOM TEETH EXTRACTION       Current Facility-Administered Medications   Medication Dose Route Frequency    morphine injection 1 mg  1 mg IntraVENous Q4H PRN    sodium chloride (NS) flush 5-40 mL  5-40 mL IntraVENous Q8H    sodium chloride (NS) flush 5-40 mL  5-40 mL IntraVENous PRN    heparin (porcine) injection 5,000 Units  5,000 Units SubCUTAneous Q8H    0.9% sodium chloride with KCl 20 mEq/L infusion   IntraVENous CONTINUOUS    metoclopramide HCl (REGLAN) injection 10 mg  10 mg IntraVENous Q6H PRN    ondansetron (ZOFRAN) injection 4 mg  4 mg IntraVENous Q6H PRN    pantoprazole (PROTONIX) 40 mg in 0.9% sodium chloride 10 mL injection  40 mg IntraVENous Q24H     Patient has no known allergies. Social History     Socioeconomic History    Marital status: SINGLE     Spouse name: Not on file    Number of children: Not on file    Years of education: Not on file    Highest education level: Not on file   Tobacco Use    Smoking status: Never Smoker   Substance and Sexual Activity    Alcohol use: Not Currently    Drug use: Never     Social Determinants of Health     Financial Resource Strain:     Difficulty of Paying Living Expenses:    Food Insecurity:     Worried About Running Out of Food in the Last Year:     [address] in the Last Year:    Transportation Needs:     Lack of Transportation (Medical):  Lack of Transportation (Non-Medical):    Physical Activity:     Days of Exercise per Week:     Minutes of Exercise per Session:    Stress:     Feeling of Stress :    Social Connections:     Frequency of Communication with Friends and Family:     Frequency of Social Gatherings with Friends and Family:     Attends Orthodox Services:     Active Member of Clubs or Organizations:     Attends Club or Organization Meetings:     Marital Status:      Social History     Tobacco Use   Smoking Status Never Smoker     No family history on file. ROS: The patient has no difficulty with chest pain or shortness of breath. No fever or chills. Comprehensive review of systems was otherwise unremarkable except as noted above. Physical Exam:     Vitals:    09/15/21 1506   BP: (!) 152/92   Pulse: 78   Temp: 98.2 °F (36.8 °C)   SpO2: 99%     Constitutional: Alert, oriented, cooperative patient in no acute distress; appears stated age    CV: RRR. Normal perfusion  Resp: No JVD. Breathing is  non-labored; no audible wheezing.     GI: Soft and mildly distended, point tenderness in the right and mid lower abdomen  Musculoskeletal: No embolic signs or cyanosis. Neuro:  Oriented; no focal deficits  Psychiatric: normal affect and mood, no memory impairment    Recent Labs     09/14/21  0340   WBC 11.9*   HGB 11.0*         K 4.2      CO2 26   BUN 6   CREA 0.72   *     Review of most recent CBC  Lab Results   Component Value Date/Time    WBC 11.9 (H) 09/14/2021 03:40 AM    HGB 11.0 (L) 09/14/2021 03:40 AM    HCT 34.0 (L) 09/14/2021 03:40 AM    PLATELET 986 35/69/0653 03:40 AM    MCV 88.3 09/14/2021 03:40 AM       Review of most recent BMP  Lab Results   Component Value Date/Time    Sodium 140 09/14/2021 03:40 AM    Potassium 4.2 09/14/2021 03:40 AM    Chloride 107 09/14/2021 03:40 AM    CO2 26 09/14/2021 03:40 AM    Anion gap 7 09/14/2021 03:40 AM    Glucose 114 (H) 09/14/2021 03:40 AM    BUN 6 09/14/2021 03:40 AM    Creatinine 0.72 09/14/2021 03:40 AM    GFR est AA >60 09/14/2021 03:40 AM    GFR est non-AA >60 09/14/2021 03:40 AM    Calcium 8.7 09/14/2021 03:40 AM       Review of most recent HgbA1c  Lab Results   Component Value Date/Time    Hemoglobin A1c 4.8 07/06/2021 09:08 AM       XR Results (most recent):  Results from Hospital Encounter encounter on 07/06/21    XR UPPER GI W KUB AIR CONT    Narrative  Double contrast upper GI series    HISTORY: Preoperative evaluation for gastric bypass surgery. No current  complaints. COMPARISON: None. Findings: A  film of the abdomen was obtained. The intestinal gas pattern  shows no evidence of obstruction. A double contrast upper GI series was performed. Barium transits the esophagus  to the stomach without obstruction or stricture. The esophageal mucosal pattern  and motility are unremarkable. There was a small amount of spontaneous  gastroesophageal reflux. There was no hiatal hernia. The gastric contour, rugal mucosal pattern and motility were unremarkable.  The  pylorus and duodenal bulb distend without distortion. There is no evidence of  gastric or duodenal ulcer disease. The duodenal sweep is normal.    24 fluoroscopic images were submitted. 1.9 minutes of fluoroscopy was used for this exam.    Impression  Gastroesophageal reflux. CT Results (most recent):  CT abdomen/pelvis with oral and IV contrast (09/15/2021):  CT ABDOMEN PELVIS W CONTRAST 9/15/2021 4:50 AM     INDICATION: Abdominal pain, hernia suspected;     COMPARISON: None     TECHNIQUE: Axial CT images were obtained of the abdomen and pelvis after administration of 90 mL of Omnipaque 350 intravenous contrast. Oral contrast was also administered.  Coronal reformats were generated. FINDINGS:     LOWER CHEST:   .  Heart/Vessels: Within normal limits. White Kera: Bibasilar atelectasis. .  Pleura: No effusions. ABDOMEN:   Storm Kras: Within normal limits. Belenda Alpesh system: Within normal limits.     .  Spleen: Within normal limits. .  Pancreas: Within normal limits.     .  Adrenals: Within normal limits.     .  Kidneys: No hydronephrosis. Punctate nonobstructing right upper pole calculus.     .  Stomach/bowel: Status post gastric bypass surgery. Multiple mildly dilated small bowel loops in the left upper and mid abdomen with air-fluid and/or contrast levels extending to the level of the small bowel anastomosis in the left midabdomen (series 201, image 68). More distal small bowel loops are decompressed. Normal appendix   .  Peritoneum: No intra-abdominal fluid collection or pneumoperitoneum. .  Vascular: Within normal limits     PELVIS   .  Bladder: Within normal limits. Tuyet Isidro: Within normal limits. .  Reproductive organs: Within normal limits. MSK:  No acute or aggressive osseous lesion. Nonspecific soft tissue gas and fluid in the ventral abdominal wall, which could be postsurgical.     IMPRESSION:   Multiple mildly dilated small bowel loops in the left upper and mid abdomen, as above.  In the setting of recent abdominal surgery, finding could reflect a localized ileus or developing small bowel obstruction. ASSESSMENT/PLAN:  Problem List  Never Reviewed        Codes Class Noted    SBO (small bowel obstruction) ([facility] ICD-10-CM: I00.251  ICD-9-CM: 560.9  9/15/2021        Morbid obesity ([facility] ICD-10-CM: E66.01  ICD-9-CM: 278.01  6/8/2021        SHANTA (obstructive sleep apnea) ICD-10-CM: G47.33  ICD-9-CM: 327.23  6/8/2021        Back pain ICD-10-CM: M54.9  ICD-9-CM: 724.5  6/8/2021            Active Problems:    SBO (small bowel obstruction) ([facility] (9/15/2021)       Plan:     1. Admit for observation and management  2. Remain NPO, IVF hydration  3. DVT prophylaxis - Heparin, SCDs  4. GI prophylaxis - IV Protonix  5. Antiemetics - IV Zofran, Phenergan PRN  6. Imaging- KUB in the morning    Manual DHARMESH Tuttle  09/15/2021    I have seen, examined, and discussed this patient with Dr. Brayan Lai. Counseling time:counseling time more than 50% of visit: 20 minutes: I spent this time preparing to see patient (including chart review and preparation), obtaining and/or reviewing additional medical history, performing a physical exam and evaluation, documenting clinical information in the electronic health record, independently interpreting results, communicating results to patient, family or caregiver, and/or coordinating care.

## 2021-09-15 NOTE — PROGRESS NOTES
Pt arrived floor via EMS transport. Pt is alert and oriented but drowsy. Pt oriented to room and call light. Dual skin assessment performed with [facility] RN. Pt has 5 abd trocar sites, pt has tattoos. Pt denies needs at this time. Report given to night shift RN.

## 2021-09-15 NOTE — PROGRESS NOTES
Unable to bladder scan pt because pt said she went to the bathroom by herself shortly after she arrived the floor  but has not gone again. This RN instructed pt to call for help to the bathroom next time and to bladder scan her. Pt verbalized understanding.

## 2021-09-16 ENCOUNTER — APPOINTMENT (OUTPATIENT)
Dept: GENERAL RADIOLOGY | Age: 36
DRG: 252 | End: 2021-09-16
Attending: SURGERY
Payer: COMMERCIAL

## 2021-09-16 ENCOUNTER — APPOINTMENT (OUTPATIENT)
Dept: GENERAL RADIOLOGY | Age: 36
DRG: 252 | End: 2021-09-16
Attending: PHYSICIAN ASSISTANT
Payer: COMMERCIAL

## 2021-09-16 LAB
ANION GAP SERPL CALC-SCNC: 4 MMOL/L (ref 7–16)
BASOPHILS # BLD: 0 K/UL (ref 0–0.2)
BASOPHILS NFR BLD: 0 % (ref 0–2)
BUN SERPL-MCNC: 10 MG/DL (ref 6–23)
CALCIUM SERPL-MCNC: 9.2 MG/DL (ref 8.3–10.4)
CHLORIDE SERPL-SCNC: 109 MMOL/L (ref 98–107)
CO2 SERPL-SCNC: 28 MMOL/L (ref 21–32)
CREAT SERPL-MCNC: 0.84 MG/DL (ref 0.6–1)
DIFFERENTIAL METHOD BLD: ABNORMAL
EOSINOPHIL # BLD: 0 K/UL (ref 0–0.8)
EOSINOPHIL NFR BLD: 0 % (ref 0.5–7.8)
ERYTHROCYTE [DISTWIDTH] IN BLOOD BY AUTOMATED COUNT: 12.6 % (ref 11.9–14.6)
GLUCOSE SERPL-MCNC: 82 MG/DL (ref 65–100)
HCT VFR BLD AUTO: 40.2 % (ref 35.8–46.3)
HGB BLD-MCNC: 12.7 G/DL (ref 11.7–15.4)
IMM GRANULOCYTES # BLD AUTO: 0 K/UL (ref 0–0.5)
IMM GRANULOCYTES NFR BLD AUTO: 0 % (ref 0–5)
LYMPHOCYTES # BLD: 1.9 K/UL (ref 0.5–4.6)
LYMPHOCYTES NFR BLD: 18 % (ref 13–44)
MCH RBC QN AUTO: 28.5 PG (ref 26.1–32.9)
MCHC RBC AUTO-ENTMCNC: 31.6 G/DL (ref 31.4–35)
MCV RBC AUTO: 90.3 FL (ref 79.6–97.8)
MONOCYTES # BLD: 1.4 K/UL (ref 0.1–1.3)
MONOCYTES NFR BLD: 13 % (ref 4–12)
NEUTS SEG # BLD: 7.2 K/UL (ref 1.7–8.2)
NEUTS SEG NFR BLD: 68 % (ref 43–78)
NRBC # BLD: 0 K/UL (ref 0–0.2)
PLATELET # BLD AUTO: 233 K/UL (ref 150–450)
PMV BLD AUTO: 9.7 FL (ref 9.4–12.3)
POTASSIUM SERPL-SCNC: 3.7 MMOL/L (ref 3.5–5.1)
RBC # BLD AUTO: 4.45 M/UL (ref 4.05–5.2)
SODIUM SERPL-SCNC: 141 MMOL/L (ref 136–145)
WBC # BLD AUTO: 10.6 K/UL (ref 4.3–11.1)

## 2021-09-16 PROCEDURE — 74011000250 HC RX REV CODE- 250: Performed by: SURGERY

## 2021-09-16 PROCEDURE — 80048 BASIC METABOLIC PNL TOTAL CA: CPT

## 2021-09-16 PROCEDURE — 74011250636 HC RX REV CODE- 250/636: Performed by: SURGERY

## 2021-09-16 PROCEDURE — 36415 COLL VENOUS BLD VENIPUNCTURE: CPT

## 2021-09-16 PROCEDURE — 65270000029 HC RM PRIVATE

## 2021-09-16 PROCEDURE — 74011000636 HC RX REV CODE- 636: Performed by: SURGERY

## 2021-09-16 PROCEDURE — 74011250636 HC RX REV CODE- 250/636: Performed by: PHYSICIAN ASSISTANT

## 2021-09-16 PROCEDURE — C9113 INJ PANTOPRAZOLE SODIUM, VIA: HCPCS | Performed by: SURGERY

## 2021-09-16 PROCEDURE — 85025 COMPLETE CBC W/AUTO DIFF WBC: CPT

## 2021-09-16 PROCEDURE — 74240 X-RAY XM UPR GI TRC 1CNTRST: CPT

## 2021-09-16 PROCEDURE — APPNB30 APP NON BILLABLE TIME 0-30 MINS: Performed by: PHYSICIAN ASSISTANT

## 2021-09-16 RX ORDER — ONDANSETRON 2 MG/ML
4 INJECTION INTRAMUSCULAR; INTRAVENOUS EVERY 6 HOURS
Status: DISCONTINUED | OUTPATIENT
Start: 2021-09-16 | End: 2021-09-16

## 2021-09-16 RX ORDER — METOCLOPRAMIDE HYDROCHLORIDE 5 MG/ML
10 INJECTION INTRAMUSCULAR; INTRAVENOUS EVERY 6 HOURS
Status: DISCONTINUED | OUTPATIENT
Start: 2021-09-16 | End: 2021-09-18 | Stop reason: HOSPADM

## 2021-09-16 RX ORDER — ONDANSETRON 2 MG/ML
4 INJECTION INTRAMUSCULAR; INTRAVENOUS EVERY 6 HOURS
Status: DISCONTINUED | OUTPATIENT
Start: 2021-09-16 | End: 2021-09-17

## 2021-09-16 RX ORDER — HYDROMORPHONE HYDROCHLORIDE 1 MG/ML
0.5 INJECTION, SOLUTION INTRAMUSCULAR; INTRAVENOUS; SUBCUTANEOUS
Status: DISCONTINUED | OUTPATIENT
Start: 2021-09-16 | End: 2021-09-18 | Stop reason: HOSPADM

## 2021-09-16 RX ADMIN — HYDROMORPHONE HYDROCHLORIDE 0.5 MG: 1 INJECTION, SOLUTION INTRAMUSCULAR; INTRAVENOUS; SUBCUTANEOUS at 02:27

## 2021-09-16 RX ADMIN — DIATRIZOATE MEGLUMINE AND DIATRIZOATE SODIUM 120 ML: 660; 100 LIQUID ORAL; RECTAL at 08:57

## 2021-09-16 RX ADMIN — Medication 10 ML: at 21:35

## 2021-09-16 RX ADMIN — HYDROMORPHONE HYDROCHLORIDE 0.5 MG: 1 INJECTION, SOLUTION INTRAMUSCULAR; INTRAVENOUS; SUBCUTANEOUS at 11:22

## 2021-09-16 RX ADMIN — POTASSIUM CHLORIDE AND SODIUM CHLORIDE: 900; 150 INJECTION, SOLUTION INTRAVENOUS at 02:27

## 2021-09-16 RX ADMIN — Medication 10 ML: at 06:54

## 2021-09-16 RX ADMIN — HEPARIN SODIUM 5000 UNITS: 5000 INJECTION INTRAVENOUS; SUBCUTANEOUS at 08:28

## 2021-09-16 RX ADMIN — HEPARIN SODIUM 5000 UNITS: 5000 INJECTION INTRAVENOUS; SUBCUTANEOUS at 00:34

## 2021-09-16 RX ADMIN — HYDROMORPHONE HYDROCHLORIDE 0.5 MG: 1 INJECTION, SOLUTION INTRAMUSCULAR; INTRAVENOUS; SUBCUTANEOUS at 23:24

## 2021-09-16 RX ADMIN — Medication 10 ML: at 13:48

## 2021-09-16 RX ADMIN — ONDANSETRON 4 MG: 2 INJECTION INTRAMUSCULAR; INTRAVENOUS at 11:22

## 2021-09-16 RX ADMIN — METOCLOPRAMIDE 10 MG: 5 INJECTION, SOLUTION INTRAMUSCULAR; INTRAVENOUS at 11:21

## 2021-09-16 RX ADMIN — SODIUM CHLORIDE 40 MG: 9 INJECTION, SOLUTION INTRAMUSCULAR; INTRAVENOUS; SUBCUTANEOUS at 16:38

## 2021-09-16 RX ADMIN — POTASSIUM CHLORIDE AND SODIUM CHLORIDE: 900; 150 INJECTION, SOLUTION INTRAVENOUS at 13:48

## 2021-09-16 RX ADMIN — ONDANSETRON 4 MG: 2 INJECTION INTRAMUSCULAR; INTRAVENOUS at 18:17

## 2021-09-16 RX ADMIN — METOCLOPRAMIDE 10 MG: 5 INJECTION, SOLUTION INTRAMUSCULAR; INTRAVENOUS at 18:17

## 2021-09-16 RX ADMIN — HYDROMORPHONE HYDROCHLORIDE 0.5 MG: 1 INJECTION, SOLUTION INTRAMUSCULAR; INTRAVENOUS; SUBCUTANEOUS at 18:17

## 2021-09-16 RX ADMIN — HEPARIN SODIUM 5000 UNITS: 5000 INJECTION INTRAVENOUS; SUBCUTANEOUS at 23:28

## 2021-09-16 RX ADMIN — HEPARIN SODIUM 5000 UNITS: 5000 INJECTION INTRAVENOUS; SUBCUTANEOUS at 16:38

## 2021-09-16 NOTE — PROGRESS NOTES
Bariatric Surgery Daily Progress Note    Patient: Angie Vasquez  MRN: [id number]  Date: 9/16/2021 7:41 AM  Admit Date: 9/15/2021  Procedure: Robotic assisted sleeve gastrectomy, new partial small bowel ostruction    Subjective:     Angie Vasquez is a 31 y.o. female who is POD #3 s/p gastric bypass completed by Dr. Marisela Herr who was readmitted on 09/15/2021 for possible small bowel obstruction. She has been kept NPO and maintained on IVF, pain medication and anti-emetics. She reports that her symptoms are about the same as yesterday, and she is having a lot of nausea from the pain medication. She has persistent moderate to severe abdominal pain in the right and middle lower abdomen. She has been voiding without issues, and is frustrated as she is currently NPO and wants to drink. Denies chest pain, shortness of breath, fevers or chills. Objective:     MEDS:    Current Facility-Administered Medications   Medication    HYDROmorphone (DILAUDID) injection 0.5 mg    ondansetron (ZOFRAN) injection 4 mg    sodium chloride (NS) flush 5-40 mL    sodium chloride (NS) flush 5-40 mL    heparin (porcine) injection 5,000 Units    0.9% sodium chloride with KCl 20 mEq/L infusion    metoclopramide HCl (REGLAN) injection 10 mg    pantoprazole (PROTONIX) 40 mg in 0.9% sodium chloride 10 mL injection       ALLERGIES:       No Known Allergies    I/O:      Intake/Output Summary (Last 24 hours) at 9/16/2021 0823  Last data filed at 9/16/2021 0739  Gross per 24 hour   Intake 1829.17 ml   Output --   Net 1829.17 ml           Physical Exam:     Visit Vitals  BP (!) 151/110 Comment: RN notified   Pulse (!) 57   Temp 97.8 °F (36.6 °C)   Resp 18   SpO2 98%       General:  Alert, oriented, cooperative in no acute distress. Neuro:  Alert, oriented to person, place and time. Lungs:  Unlabored breathing. Symmetrical chest expansion. Heart: Regular rate and rhythm.   Abdomen:  Soft, mildly distended, tender at the right lower quadrant and mid lower abdomen. Lap incisions C/D/I without presence of erythema, infection, or allergic reaction. Extremities:  Extremities normal, atraumatic, no cyanosis or edema. Labs: All recent labs were reviewed. Recent Results (from the past 24 hour(s))   CBC W/O DIFF    Collection Time: 09/15/21  5:01 PM   Result Value Ref Range    WBC 10.3 4.3 - 11.1 K/uL    RBC 4.23 4.05 - 5.2 M/uL    HGB 12.0 11.7 - 15.4 g/dL    HCT 37.7 35.8 - 46.3 %    MCV 89.1 79.6 - 97.8 FL    MCH 28.4 26.1 - 32.9 PG    MCHC 31.8 31.4 - 35.0 g/dL    RDW 12.7 11.9 - 14.6 %    PLATELET 184 267 - 378 K/uL    MPV 10.1 9.4 - 12.3 FL    ABSOLUTE NRBC 0.00 0.0 - 0.2 K/uL   METABOLIC PANEL, BASIC    Collection Time: 09/15/21  5:01 PM   Result Value Ref Range    Sodium 139 136 - 145 mmol/L    Potassium 3.7 3.5 - 5.1 mmol/L    Chloride 107 98 - 107 mmol/L    CO2 25 21 - 32 mmol/L    Anion gap 7 7 - 16 mmol/L    Glucose 84 65 - 100 mg/dL    BUN 8 6 - 23 MG/DL    Creatinine 0.84 0.6 - 1.0 MG/DL    GFR est AA >60 >60 ml/min/1.73m2    GFR est non-AA >60 >60 ml/min/1.73m2    Calcium 9.1 8.3 - 10.4 MG/DL       Imaging: KUB to be completed this morning. No results found. Assessment/Plan:   Oral Gamez is a 31 y.o. female s/p Robotic assisted gastric bypass with new onset partial bowel obstruction    Pain control   DIET NPO   UGI this morning  Lap incisions C/D/I  No tachycardia overnight. Labs reviewed. WBC 10.3. Hgb stable 12.0.   OOB and ambulate as tolerated  DVT proph - SCDs/Heparin  Discharge to home to be determined    Silvia Spangler PA-C  Date: 9/16/2021    Counseling time:counseling time more than 50% of visit: 20 minutes: I spent this time preparing to see patient (including chart review and preparation), obtaining and/or reviewing additional medical history, performing a physical exam and evaluation, documenting clinical information in the electronic health record, independently interpreting results, communicating results to patient, family or caregiver, and/or coordinating care.

## 2021-09-16 NOTE — PROGRESS NOTES
END OF SHIFT NOTE:    Intake/Output  09/16 0701 - 09/16 1900  In: 1829.2 [I.V.:1829.2]  Out: -    Voiding: YES  Catheter: NO  Drain:              Stool:  0 occurrences. Emesis:  0 occurrences. VITAL SIGNS  Patient Vitals for the past 12 hrs:   Temp Pulse Resp BP SpO2   09/16/21 0400 97.8 °F (36.6 °C) (!) 57 18 (!) 151/110 --   09/15/21 2300 98.6 °F (37 °C) 75 17 (!) 139/105 98 %   09/15/21 1951 99.2 °F (37.3 °C) 71 18 (!) 143/93 98 %       Pain Assessment  Pain 1  Pain Scale 1: Visual (09/16/21 0400)  Pain Intensity 1: 0 (09/16/21 0400)  Patient Stated Pain Goal: 0 (09/16/21 0400)  Pain Location 1: Abdomen; Head (09/15/21 1600)  Pain Orientation 1: Right; Lower (09/15/21 1600)  Pain Description 1: Aching (09/15/21 1600)  Pain Intervention(s) 1: Medication (see MAR) (09/15/21 1600)    Ambulating  Yes    Additional Information:   Pt reported feel terrible after morphine  Dilaudid x1  Morphine x1  zofran x1     Shift report given to oncoming nurse at the bedside.     Mora Landa RN

## 2021-09-16 NOTE — PROGRESS NOTES
Physician Progress Note      PATIENT:               Debi Merino  CSN #:                  [id number]  :                       1985  ADMIT DATE:       9/15/2021 2:58 PM  [address] [place] DATE:  RESPONDING  PROVIDER #:        Kayli Alan MD          QUERY TEXT:    Pt admitted with SBO. ? Pt noted to be s/p gastric bypass on 21.? If possible, please document in progress notes and discharge summary:    The medical record reflects the following:  ?? Risk Factors: recent surgical procedure  ? ? Clinical Indicators: s/p Robotic assisted gastric bypass with new onset partial bowel obstruction  ? ? Treatment: NPO, IVF, UGI pending  Options provided:  -- SBO is a postoperative complication  -- SBO is an expected/inherent condition that occurred postoperatively and not a complication  -- SBO is not a postoperative complication, but is due to other incidental risk factor, Please specify other incidental risk factor  -- Other - I will add my own diagnosis  -- Disagree - Not applicable / Not valid  -- Disagree - Clinically unable to determine / Unknown  -- Refer to Clinical Documentation Reviewer    PROVIDER RESPONSE TEXT:    It is not SBO. She has swelling from recent surgery causing a partial SBO or slow transit.     Query created by: Venu Keating on 2021 9:29 AM      Electronically signed by:  Kayli Alan MD 2021 10:54 AM

## 2021-09-16 NOTE — PROGRESS NOTES
Problem: Falls - Risk of  Goal: *Absence of Falls  Description: Document Lai Rios Fall Risk and appropriate interventions in the flowsheet.   Outcome: Progressing Towards Goal  Note: Fall Risk Interventions:  Mobility Interventions: Assess mobility with egress test, Bed/chair exit alarm, Patient to call before getting OOB         Medication Interventions: Assess postural VS orthostatic hypotension, Bed/chair exit alarm, Patient to call before getting OOB    Elimination Interventions: Bed/chair exit alarm, Call light in reach              Problem: Patient Education: Go to Patient Education Activity  Goal: Patient/Family Education  Outcome: Progressing Towards Goal

## 2021-09-17 ENCOUNTER — APPOINTMENT (OUTPATIENT)
Dept: GENERAL RADIOLOGY | Age: 36
DRG: 252 | End: 2021-09-17
Attending: SURGERY
Payer: COMMERCIAL

## 2021-09-17 LAB
ANION GAP SERPL CALC-SCNC: 8 MMOL/L (ref 7–16)
BASOPHILS # BLD: 0 K/UL (ref 0–0.2)
BASOPHILS NFR BLD: 0 % (ref 0–2)
BUN SERPL-MCNC: 9 MG/DL (ref 6–23)
CALCIUM SERPL-MCNC: 8.6 MG/DL (ref 8.3–10.4)
CHLORIDE SERPL-SCNC: 108 MMOL/L (ref 98–107)
CO2 SERPL-SCNC: 24 MMOL/L (ref 21–32)
CREAT SERPL-MCNC: 0.74 MG/DL (ref 0.6–1)
DIFFERENTIAL METHOD BLD: ABNORMAL
EOSINOPHIL # BLD: 0.1 K/UL (ref 0–0.8)
EOSINOPHIL NFR BLD: 1 % (ref 0.5–7.8)
ERYTHROCYTE [DISTWIDTH] IN BLOOD BY AUTOMATED COUNT: 12.5 % (ref 11.9–14.6)
GLUCOSE SERPL-MCNC: 82 MG/DL (ref 65–100)
HCT VFR BLD AUTO: 36 % (ref 35.8–46.3)
HGB BLD-MCNC: 11.5 G/DL (ref 11.7–15.4)
IMM GRANULOCYTES # BLD AUTO: 0 K/UL (ref 0–0.5)
IMM GRANULOCYTES NFR BLD AUTO: 0 % (ref 0–5)
LYMPHOCYTES # BLD: 2.4 K/UL (ref 0.5–4.6)
LYMPHOCYTES NFR BLD: 27 % (ref 13–44)
MCH RBC QN AUTO: 28.3 PG (ref 26.1–32.9)
MCHC RBC AUTO-ENTMCNC: 31.9 G/DL (ref 31.4–35)
MCV RBC AUTO: 88.7 FL (ref 79.6–97.8)
MONOCYTES # BLD: 1.1 K/UL (ref 0.1–1.3)
MONOCYTES NFR BLD: 12 % (ref 4–12)
NEUTS SEG # BLD: 5.1 K/UL (ref 1.7–8.2)
NEUTS SEG NFR BLD: 59 % (ref 43–78)
NRBC # BLD: 0 K/UL (ref 0–0.2)
PLATELET # BLD AUTO: 229 K/UL (ref 150–450)
PMV BLD AUTO: 9.4 FL (ref 9.4–12.3)
POTASSIUM SERPL-SCNC: 3.6 MMOL/L (ref 3.5–5.1)
RBC # BLD AUTO: 4.06 M/UL (ref 4.05–5.2)
SODIUM SERPL-SCNC: 140 MMOL/L (ref 136–145)
WBC # BLD AUTO: 8.6 K/UL (ref 4.3–11.1)

## 2021-09-17 PROCEDURE — 65270000029 HC RM PRIVATE

## 2021-09-17 PROCEDURE — 74011000250 HC RX REV CODE- 250: Performed by: SURGERY

## 2021-09-17 PROCEDURE — C9113 INJ PANTOPRAZOLE SODIUM, VIA: HCPCS | Performed by: SURGERY

## 2021-09-17 PROCEDURE — 85025 COMPLETE CBC W/AUTO DIFF WBC: CPT

## 2021-09-17 PROCEDURE — 74018 RADEX ABDOMEN 1 VIEW: CPT

## 2021-09-17 PROCEDURE — 74011250636 HC RX REV CODE- 250/636: Performed by: SURGERY

## 2021-09-17 PROCEDURE — 74011250637 HC RX REV CODE- 250/637: Performed by: PHYSICIAN ASSISTANT

## 2021-09-17 PROCEDURE — 36415 COLL VENOUS BLD VENIPUNCTURE: CPT

## 2021-09-17 PROCEDURE — 74011250636 HC RX REV CODE- 250/636: Performed by: PHYSICIAN ASSISTANT

## 2021-09-17 PROCEDURE — 80048 BASIC METABOLIC PNL TOTAL CA: CPT

## 2021-09-17 RX ORDER — ACETAMINOPHEN 325 MG/1
650 TABLET ORAL
Status: DISCONTINUED | OUTPATIENT
Start: 2021-09-17 | End: 2021-09-18 | Stop reason: HOSPADM

## 2021-09-17 RX ADMIN — METOCLOPRAMIDE 10 MG: 5 INJECTION, SOLUTION INTRAMUSCULAR; INTRAVENOUS at 23:59

## 2021-09-17 RX ADMIN — METOCLOPRAMIDE 10 MG: 5 INJECTION, SOLUTION INTRAMUSCULAR; INTRAVENOUS at 04:33

## 2021-09-17 RX ADMIN — HYDROMORPHONE HYDROCHLORIDE 0.5 MG: 1 INJECTION, SOLUTION INTRAMUSCULAR; INTRAVENOUS; SUBCUTANEOUS at 17:46

## 2021-09-17 RX ADMIN — ACETAMINOPHEN 650 MG: 325 TABLET, FILM COATED ORAL at 23:59

## 2021-09-17 RX ADMIN — HEPARIN SODIUM 5000 UNITS: 5000 INJECTION INTRAVENOUS; SUBCUTANEOUS at 09:19

## 2021-09-17 RX ADMIN — HYDROMORPHONE HYDROCHLORIDE 0.5 MG: 1 INJECTION, SOLUTION INTRAMUSCULAR; INTRAVENOUS; SUBCUTANEOUS at 22:06

## 2021-09-17 RX ADMIN — HYDROMORPHONE HYDROCHLORIDE 0.5 MG: 1 INJECTION, SOLUTION INTRAMUSCULAR; INTRAVENOUS; SUBCUTANEOUS at 12:38

## 2021-09-17 RX ADMIN — SODIUM CHLORIDE 40 MG: 9 INJECTION, SOLUTION INTRAMUSCULAR; INTRAVENOUS; SUBCUTANEOUS at 17:46

## 2021-09-17 RX ADMIN — HYDROMORPHONE HYDROCHLORIDE 0.5 MG: 1 INJECTION, SOLUTION INTRAMUSCULAR; INTRAVENOUS; SUBCUTANEOUS at 05:33

## 2021-09-17 RX ADMIN — Medication 10 ML: at 05:33

## 2021-09-17 RX ADMIN — Medication 10 ML: at 21:16

## 2021-09-17 RX ADMIN — POTASSIUM CHLORIDE AND SODIUM CHLORIDE: 900; 150 INJECTION, SOLUTION INTRAVENOUS at 17:55

## 2021-09-17 RX ADMIN — HYDROMORPHONE HYDROCHLORIDE 0.5 MG: 1 INJECTION, SOLUTION INTRAMUSCULAR; INTRAVENOUS; SUBCUTANEOUS at 09:19

## 2021-09-17 RX ADMIN — Medication 10 ML: at 14:30

## 2021-09-17 RX ADMIN — HEPARIN SODIUM 5000 UNITS: 5000 INJECTION INTRAVENOUS; SUBCUTANEOUS at 16:28

## 2021-09-17 RX ADMIN — METOCLOPRAMIDE 10 MG: 5 INJECTION, SOLUTION INTRAMUSCULAR; INTRAVENOUS at 12:38

## 2021-09-17 RX ADMIN — HEPARIN SODIUM 5000 UNITS: 5000 INJECTION INTRAVENOUS; SUBCUTANEOUS at 23:59

## 2021-09-17 RX ADMIN — ONDANSETRON 4 MG: 2 INJECTION INTRAMUSCULAR; INTRAVENOUS at 04:33

## 2021-09-17 RX ADMIN — METOCLOPRAMIDE 10 MG: 5 INJECTION, SOLUTION INTRAMUSCULAR; INTRAVENOUS at 17:46

## 2021-09-17 NOTE — PROGRESS NOTES
Care Management Interventions  PCP Verified by CM: Yes  Transition of Care Consult (CM Consult): Home Health  Support Systems: Spouse/Significant Other  Confirm Follow Up Transport: Family  The Plan for Transition of Care is Related to the Following Treatment Goals : Return Home  Discharge Location  Discharge Placement: Home    SW spoke w/ pt, pt is a 30 y/o female that came in w/ SBO. Pt lives w/ her boyfriend and 3 kids. Pt is independent and drives. Pt does not have or use any DME at home. Pt had surgery a few weeks ago (Gastric Bypass) and now  her left side near her stomach is hurting constantly. Pt is going to have a CAT Scan today. Pt's PCP is Dr. Elsa Quick, last seen a few months ago, pharmacy of choice is [facility] in [place].  MIGUEL will follow pt until d/c    ELEONORA Wright

## 2021-09-17 NOTE — PROGRESS NOTES
Hourly rounds completed throughout this shift. Pain and nausea medicine given per MAR. Pt reported she had one loose bm this shift. Pt resting in bed; denies needs at this time. Will continue to monitor and report to oncoming night shift nurse.

## 2021-09-17 NOTE — PROGRESS NOTES
END OF SHIFT NOTE:    Intake/Output  09/16 1901 - 09/17 0700  In: 900 [I.V.:900]  Out: -    Voiding: YES  Catheter: NO  Drain:              Stool:  0 occurrences. Stool Assessment  Stool Appearance: Loose (09/16/21 1152)    Emesis:  0 occurrences. VITAL SIGNS  Patient Vitals for the past 12 hrs:   Temp Pulse Resp BP SpO2   09/17/21 0454 98.5 °F (36.9 °C) 83 18 (!) 140/88 99 %   09/16/21 2226 98.7 °F (37.1 °C) 78 17 (!) 145/90 98 %   09/16/21 1826 98.8 °F (37.1 °C) 75 16 (!) 148/84 97 %       Pain Assessment  Pain 1  Pain Scale 1: Numeric (0 - 10) (09/17/21 0454)  Pain Intensity 1: 0 (09/17/21 0454)  Patient Stated Pain Goal: 0 (09/17/21 0454)  Pain Reassessment 1: Yes (09/16/21 1500)  Pain Onset 1: acute (09/16/21 0731)  Pain Location 1: Abdomen (09/16/21 0731)  Pain Orientation 1: Right (09/16/21 0731)  Pain Description 1: Aching (09/16/21 0731)  Pain Intervention(s) 1: Medication (see MAR) (09/16/21 0731)    Ambulating  Yes    Additional Information:   _Dilaudid x2    Shift report given to oncoming nurse at the bedside.     Starla Degroot RN

## 2021-09-17 NOTE — PROGRESS NOTES
Bariatric Surgery Daily Progress Note    Patient: Keaton Dykes  MRN: [id number]  Date: 9/17/2021 7:17 AM  Admit Date: 9/15/2021  Procedure: Robotic assisted sleeve gastrectomy, new possible anastomotic swelling    Subjective:     Keaton Dykes is a 31 y.o. female who is POD #4 s/p gastric bypass completed by Dr. Kailash Orellana who was readmitted on 09/15/2021 for possible swelling at the anastomosis. She reports that she is feeling much better today. Her abdominal pain is significantly less compared to yesterday; when laying she has no pain and only experiences moderate pain when moving. She continues to have some nausea with medications, but denies vomiting. She has been OOB ambulating, voiding without difficulty, having bowel movements, and tolerating sips of clears without difficulty. She denies chest pain, shortness of breath, fevers or chills. She does report low back pain due to the hospital bed. Objective:     MEDS:    Current Facility-Administered Medications   Medication    HYDROmorphone (DILAUDID) injection 0.5 mg    metoclopramide HCl (REGLAN) injection 10 mg    ondansetron (ZOFRAN) injection 4 mg    sodium chloride (NS) flush 5-40 mL    sodium chloride (NS) flush 5-40 mL    heparin (porcine) injection 5,000 Units    0.9% sodium chloride with KCl 20 mEq/L infusion    pantoprazole (PROTONIX) 40 mg in 0.9% sodium chloride 10 mL injection       ALLERGIES:       No Known Allergies    I/O:      Intake/Output Summary (Last 24 hours) at 9/17/2021 0749  Last data filed at 9/17/2021 0625  Gross per 24 hour   Intake 900 ml   Output --   Net 900 ml           Physical Exam:     Visit Vitals  BP (!) 140/88   Pulse 83   Temp 98.5 °F (36.9 °C)   Resp 18   SpO2 99%       General:  Alert, oriented, cooperative in no acute distress. Neuro:  Alert, oriented to person, place and time. Lungs:  Unlabored breathing. Symmetrical chest expansion.    Heart: Regular rate and rhythm. Abdomen:  Soft, minimally distended, appropriately tender at incision sites and some continued RLQ pain. Lap incisions C/D/I without presence of erythema, infection, or allergic reaction. Extremities:  Extremities normal, atraumatic, no cyanosis or edema. Labs: All recent labs were reviewed. Recent Results (from the past 24 hour(s))   CBC WITH AUTOMATED DIFF    Collection Time: 09/16/21 10:28 AM   Result Value Ref Range    WBC 10.6 4.3 - 11.1 K/uL    RBC 4.45 4.05 - 5.2 M/uL    HGB 12.7 11.7 - 15.4 g/dL    HCT 40.2 35.8 - 46.3 %    MCV 90.3 79.6 - 97.8 FL    MCH 28.5 26.1 - 32.9 PG    MCHC 31.6 31.4 - 35.0 g/dL    RDW 12.6 11.9 - 14.6 %    PLATELET 818 546 - 024 K/uL    MPV 9.7 9.4 - 12.3 FL    ABSOLUTE NRBC 0.00 0.0 - 0.2 K/uL    DF AUTOMATED      NEUTROPHILS 68 43 - 78 %    LYMPHOCYTES 18 13 - 44 %    MONOCYTES 13 (H) 4.0 - 12.0 %    EOSINOPHILS 0 (L) 0.5 - 7.8 %    BASOPHILS 0 0.0 - 2.0 %    IMMATURE GRANULOCYTES 0 0.0 - 5.0 %    ABS. NEUTROPHILS 7.2 1.7 - 8.2 K/UL    ABS. LYMPHOCYTES 1.9 0.5 - 4.6 K/UL    ABS. MONOCYTES 1.4 (H) 0.1 - 1.3 K/UL    ABS. EOSINOPHILS 0.0 0.0 - 0.8 K/UL    ABS. BASOPHILS 0.0 0.0 - 0.2 K/UL    ABS. IMM.  GRANS. 0.0 0.0 - 0.5 K/UL   METABOLIC PANEL, BASIC    Collection Time: 09/16/21 10:28 AM   Result Value Ref Range    Sodium 141 136 - 145 mmol/L    Potassium 3.7 3.5 - 5.1 mmol/L    Chloride 109 (H) 98 - 107 mmol/L    CO2 28 21 - 32 mmol/L    Anion gap 4 (L) 7 - 16 mmol/L    Glucose 82 65 - 100 mg/dL    BUN 10 6 - 23 MG/DL    Creatinine 0.84 0.6 - 1.0 MG/DL    GFR est AA >60 >60 ml/min/1.73m2    GFR est non-AA >60 >60 ml/min/1.73m2    Calcium 9.2 8.3 - 10.4 MG/DL   CBC WITH AUTOMATED DIFF    Collection Time: 09/17/21  4:27 AM   Result Value Ref Range    WBC 8.6 4.3 - 11.1 K/uL    RBC 4.06 4.05 - 5.2 M/uL    HGB 11.5 (L) 11.7 - 15.4 g/dL    HCT 36.0 35.8 - 46.3 %    MCV 88.7 79.6 - 97.8 FL    MCH 28.3 26.1 - 32.9 PG    MCHC 31.9 31.4 - 35.0 g/dL    RDW 12.5 11.9 - 14.6 % PLATELET 493 666 - 628 K/uL    MPV 9.4 9.4 - 12.3 FL    ABSOLUTE NRBC 0.00 0.0 - 0.2 K/uL    DF AUTOMATED      NEUTROPHILS 59 43 - 78 %    LYMPHOCYTES 27 13 - 44 %    MONOCYTES 12 4.0 - 12.0 %    EOSINOPHILS 1 0.5 - 7.8 %    BASOPHILS 0 0.0 - 2.0 %    IMMATURE GRANULOCYTES 0 0.0 - 5.0 %    ABS. NEUTROPHILS 5.1 1.7 - 8.2 K/UL    ABS. LYMPHOCYTES 2.4 0.5 - 4.6 K/UL    ABS. MONOCYTES 1.1 0.1 - 1.3 K/UL    ABS. EOSINOPHILS 0.1 0.0 - 0.8 K/UL    ABS. BASOPHILS 0.0 0.0 - 0.2 K/UL    ABS. IMM. GRANS. 0.0 0.0 - 0.5 K/UL   METABOLIC PANEL, BASIC    Collection Time: 09/17/21  4:27 AM   Result Value Ref Range    Sodium 140 136 - 145 mmol/L    Potassium 3.6 3.5 - 5.1 mmol/L    Chloride 108 (H) 98 - 107 mmol/L    CO2 24 21 - 32 mmol/L    Anion gap 8 7 - 16 mmol/L    Glucose 82 65 - 100 mg/dL    BUN 9 6 - 23 MG/DL    Creatinine 0.74 0.6 - 1.0 MG/DL    GFR est AA >60 >60 ml/min/1.73m2    GFR est non-AA >60 >60 ml/min/1.73m2    Calcium 8.6 8.3 - 10.4 MG/DL       Imaging: All images were independently reviewed. XR GASTROGRAFFIN UPPER GI    Result Date: 9/16/2021  1. Moderately high-grade proximal small bowel obstruction very near the jejunojejunostomy with contrast flowing into nondilated loops distally. 2.  Unremarkable appearance of the gastric pouch and the gastrojejunostomy. 3.  Gastroesophageal reflux. 4.  Atelectasis/infiltrate at the left lung base. Preliminary results were reported by telephone to Dr. Nicolas Denton on September 16, 2021 at 10:53 hours. Assessment/Plan:   Julio Gandara is a 31 y.o. female s/p Robotic assisted gastric bypass with possible anastomotic swelling    Pain control   DIET NPO - sips of clears, may advance today  Lap incisions C/D/I  No tachycardia overnight. Labs reviewed. WBC 8.6. Hgb stable 11.5. OOB and ambulate as tolerated. PT consulted. DVT proph - SCDs/Heparin    Toshia Tijerina PA-C  Date: 9/17/2021    Counseling time:counseling time more than 50% of visit: 25 minutes:  I spent this time preparing to see patient (including chart review and preparation), obtaining and/or reviewing additional medical history, performing a physical exam and evaluation, documenting clinical information in the electronic health record, independently interpreting results, communicating results to patient, family or caregiver, and/or coordinating care.

## 2021-09-17 NOTE — DISCHARGE SUMMARY
Patient ID:  Yanick Rojas  [id number]  82 y.o.  1985    Admission Date: 9/15/2021  2:58 PM  Admitting Provider: Kalpana Jensen MD  Discharge Date: 09/18/2021    Admission Diagnoses: SBO (small bowel obstruction) [facility]) [K56.609]  Discharge Diagnoses:  Anastomotic swelling s/p gastric bypass    Hospital Course:   Patient is a 31 y.o. female who underwent Robotic assisted gastric bypass on the date of admission by Dr. Kalpana Jensen. There were no complications and the patient tolerated the procedure well. She was admitted to the inpatient hospital service post-operatively for an expected 1-2 overnight hospital stay. Following the procedure, the patient was started on a clear liquid and protein diet, given pain and nausea medication, and maintained on continuous IVF, was encouraged to use incentive spirometry and walk frequently. Daily morning labs were drawn to check hemoglobin levels, electrolytes, and kidney function, and vitals were monitored to assess patient status. The patient was maintained on both GI prophylaxis and VTE prophylaxis, and was evaluated by physical therapy. At the time of discharge, 09/14/2021, patient was doing well without any significant chest pain, difficulty breathing, nausea or vomiting, ambulating, good urine output, pain well controlled, and able to tolerate adequate oral liquid intake. On the date of re-admission, 09/15/2021, the patient had awoken that morning around midnight with severe RLQ abdominal pain with nausea and vomiting. She was taken to [facility] emergency department, where CT scan was done showing possible ileus or partial small bowel obstruction. She was transferred to [facility] to be further managed by the surgical team. She remained NPO upon transfer, and UGI was completed the morning of 09/16/2021 which showed no complete obstruction.  She was re-initiated on a bariatric clear liquid diet, and had symptom resolution at the time of discharge on 09/18/2021. Consults: None    Significant Diagnostic Studies: labs:   Lab Results   Component Value Date/Time    WBC 8.6 09/17/2021 04:27 AM    HGB 11.5 (L) 09/17/2021 04:27 AM    HCT 36.0 09/17/2021 04:27 AM    PLATELET 142 26/29/5407 04:27 AM    MCV 88.7 09/17/2021 04:27 AM     Lab Results   Component Value Date/Time    Sodium 140 09/17/2021 04:27 AM    Potassium 3.6 09/17/2021 04:27 AM    Chloride 108 (H) 09/17/2021 04:27 AM    CO2 24 09/17/2021 04:27 AM    Anion gap 8 09/17/2021 04:27 AM    Glucose 82 09/17/2021 04:27 AM    BUN 9 09/17/2021 04:27 AM    Creatinine 0.74 09/17/2021 04:27 AM    GFR est AA >60 09/17/2021 04:27 AM    GFR est non-AA >60 09/17/2021 04:27 AM    Calcium 8.6 09/17/2021 04:27 AM     Radiology:   CT scan (09/15/2021):  Multiple mildly dilated small bowel loops in the left upper and mid abdomen, as above. In the setting of recent abdominal surgery, finding could reflect a localized ileus or developing small bowel obstruction. Upper GI (09/16/2021):  1. Moderately high-grade proximal small bowel obstruction very near the  jejunojejunostomy with contrast flowing into nondilated loops distally. 2.  Unremarkable appearance of the gastric pouch and the gastrojejunostomy. 3.  Gastroesophageal reflux. 4.  Atelectasis/infiltrate at the left lung base. KUB x-ray (09/17/2021): There is now contrast scattered throughout the colon and  rectum. Mildly dilated small bowel persist.    Disposition: Home    Discharge Medications:   Current Discharge Medication List      CONTINUE these medications which have NOT CHANGED    Details   ondansetron hcl (ZOFRAN) 8 mg tablet Take 1 Tablet by mouth every eight (8) hours as needed for Nausea or Vomiting. Qty: 30 Tablet, Refills: 0    Associated Diagnoses: Post-operative nausea and vomiting      cyclobenzaprine (FLEXERIL) 10 mg tablet Take  by mouth three (3) times daily as needed for Muscle Spasm(s).       levonorgestrel-ethinyl estradiol (Introvale) 0.15 mg-30 mcg (91) 3MPk Take 1 Tablet by mouth daily. Take / use AM day of surgery  per anesthesia protocols. hydrOXYzine HCL (ATARAX) 25 mg tablet Take 25 mg by mouth two (2) times daily as needed. omeprazole (PRILOSEC) 40 mg capsule Take 1 Capsule by mouth daily for 90 days. Indications: stress ulcer prevention  Qty: 90 Capsule, Refills: 0    Associated Diagnoses: Hx of gastroesophageal reflux (GERD)             Follow-up Care/Patient Instructions:   Activity: Activity as tolerated, no driving while on analgesics, no heavy lifting for 4 weeks, and see surgical instructions  Diet: Bariatric full liquid diet  Wound Care: Keep wound clean and dry and as directed    Follow-up Information     Follow up With Specialties Details Why Contact Andree Jung, [id number] [facility]  [phone]              Signed:  Antwon Petersen  9/17/2021  12:27 PM

## 2021-09-18 VITALS
SYSTOLIC BLOOD PRESSURE: 147 MMHG | RESPIRATION RATE: 18 BRPM | OXYGEN SATURATION: 96 % | TEMPERATURE: 99.1 F | HEART RATE: 81 BPM | DIASTOLIC BLOOD PRESSURE: 89 MMHG

## 2021-09-18 LAB
ANION GAP SERPL CALC-SCNC: 8 MMOL/L (ref 7–16)
BASOPHILS # BLD: 0 K/UL (ref 0–0.2)
BASOPHILS NFR BLD: 0 % (ref 0–2)
BUN SERPL-MCNC: 8 MG/DL (ref 6–23)
CALCIUM SERPL-MCNC: 8.6 MG/DL (ref 8.3–10.4)
CHLORIDE SERPL-SCNC: 106 MMOL/L (ref 98–107)
CO2 SERPL-SCNC: 23 MMOL/L (ref 21–32)
CREAT SERPL-MCNC: 0.72 MG/DL (ref 0.6–1)
DIFFERENTIAL METHOD BLD: ABNORMAL
EOSINOPHIL # BLD: 0.1 K/UL (ref 0–0.8)
EOSINOPHIL NFR BLD: 1 % (ref 0.5–7.8)
ERYTHROCYTE [DISTWIDTH] IN BLOOD BY AUTOMATED COUNT: 12.2 % (ref 11.9–14.6)
GLUCOSE SERPL-MCNC: 74 MG/DL (ref 65–100)
HCT VFR BLD AUTO: 34.9 % (ref 35.8–46.3)
HGB BLD-MCNC: 11.2 G/DL (ref 11.7–15.4)
IMM GRANULOCYTES # BLD AUTO: 0 K/UL (ref 0–0.5)
IMM GRANULOCYTES NFR BLD AUTO: 0 % (ref 0–5)
LYMPHOCYTES # BLD: 1.9 K/UL (ref 0.5–4.6)
LYMPHOCYTES NFR BLD: 27 % (ref 13–44)
MCH RBC QN AUTO: 28.2 PG (ref 26.1–32.9)
MCHC RBC AUTO-ENTMCNC: 32.1 G/DL (ref 31.4–35)
MCV RBC AUTO: 87.9 FL (ref 79.6–97.8)
MONOCYTES # BLD: 1 K/UL (ref 0.1–1.3)
MONOCYTES NFR BLD: 14 % (ref 4–12)
NEUTS SEG # BLD: 4.2 K/UL (ref 1.7–8.2)
NEUTS SEG NFR BLD: 58 % (ref 43–78)
NRBC # BLD: 0 K/UL (ref 0–0.2)
PLATELET # BLD AUTO: 221 K/UL (ref 150–450)
PMV BLD AUTO: 9.2 FL (ref 9.4–12.3)
POTASSIUM SERPL-SCNC: 3.7 MMOL/L (ref 3.5–5.1)
RBC # BLD AUTO: 3.97 M/UL (ref 4.05–5.2)
SODIUM SERPL-SCNC: 137 MMOL/L (ref 136–145)
WBC # BLD AUTO: 7.3 K/UL (ref 4.3–11.1)

## 2021-09-18 PROCEDURE — 36415 COLL VENOUS BLD VENIPUNCTURE: CPT

## 2021-09-18 PROCEDURE — 74011250636 HC RX REV CODE- 250/636: Performed by: PHYSICIAN ASSISTANT

## 2021-09-18 PROCEDURE — 74011250636 HC RX REV CODE- 250/636: Performed by: SURGERY

## 2021-09-18 PROCEDURE — 85025 COMPLETE CBC W/AUTO DIFF WBC: CPT

## 2021-09-18 PROCEDURE — 80048 BASIC METABOLIC PNL TOTAL CA: CPT

## 2021-09-18 RX ADMIN — HEPARIN SODIUM 5000 UNITS: 5000 INJECTION INTRAVENOUS; SUBCUTANEOUS at 08:53

## 2021-09-18 RX ADMIN — HYDROMORPHONE HYDROCHLORIDE 0.5 MG: 1 INJECTION, SOLUTION INTRAMUSCULAR; INTRAVENOUS; SUBCUTANEOUS at 08:53

## 2021-09-18 RX ADMIN — METOCLOPRAMIDE 10 MG: 5 INJECTION, SOLUTION INTRAMUSCULAR; INTRAVENOUS at 05:26

## 2021-09-18 RX ADMIN — Medication 10 ML: at 05:27

## 2021-09-18 NOTE — PROGRESS NOTES
[address] September 18, 2021       RE: Amilcar Godoy      To Whom It May Concern,    This is to certify that Amilcar Godoy may may return to work on 9/27/21. She has been in the hospital under our care from 9/13/21- 9/18/21. Please feel free to contact my office if you have any questions or concerns. Thank you for your assistance in this matter.       Sincerely,  Fozia Brennan RN

## 2021-09-18 NOTE — PROGRESS NOTES
SW reviewed patient's chart and conducted a baseline assessment. Discharge plan at this time is as follows:     Care Management Interventions  PCP Verified by CM: Yes  Transition of Care Consult (CM Consult): Home Health  Support Systems: Spouse/Significant Other  Confirm Follow Up Transport: Family  The Plan for Transition of Care is Related to the Following Treatment Goals : Return Home  Discharge Location  Discharge Placement: Home      *Please note that discharge plans can change throughout an inpatient admission.  Ensure that you are referring to the most recent social work/nurse case management note for current discharge plan*     Alan Mckay, [address] [place] [address] Work   [facility]    * [email or web address]

## 2021-09-18 NOTE — PROGRESS NOTES
Problem: Falls - Risk of  Goal: *Absence of Falls  Description: Document Sabine Sanchez Fall Risk and appropriate interventions in the flowsheet.   Outcome: Progressing Towards Goal  Note: Fall Risk Interventions:  Mobility Interventions: Communicate number of staff needed for ambulation/transfer, Patient to call before getting OOB         Medication Interventions: Assess postural VS orthostatic hypotension, Patient to call before getting OOB, Teach patient to arise slowly    Elimination Interventions: Call light in reach, Patient to call for help with toileting needs

## 2021-09-18 NOTE — DISCHARGE INSTRUCTIONS
Bariatric Surgery Discharge Instructions    Surgeon: {Attendings:[id number]}    Follow up:   Follow up with your surgeon as previously scheduled in 1-2 weeks. [address] [facility] Loss   Office number: [phone]    Diet:   When discharged from the hospital, you may begin clear and full liquid diet plus protein supplements   Start with clear liquids and progress to full liquids as tolerated   Goals: 60 grams of protein per day, 64 ounces of fluid per day   Avoid carbonated beverages and straws, avoid excessive air swallowing when drinking/eating, minimize caffeine intake. No alcohol. Wound care:   Surgical glue will flake off in 7-10 days; the edges of steri-strips may come up but leave them in place until your follow up appointment   May shower following surgery. It is okay to get soap and water on all wounds when showering. Do not soak wounds under water (bath, pool, hot tub) until healed about three weeks after surgery. Activity:   No lifting more than 25 lbs for 3 weeks. No repetitive abdominal straining (sit-ups, push-ups, crunches, pull-ups, squats), for 3 weeks. Okay to perform normal activities of daily living and walk up stairs. Walk daily. Continue deep breathing and use incentive spirometer at home.  Return to school or work when you fee comfortable. Typically 1-2 weeks for a desk job or up to 4 weeks for a manual labor job.  You may resume driving when you have minimal pain and are not taking narcotic pain medication.      Medications:   You will have been prescribed the following medications prior to discharge:  o Percocet (5mg): take one pill by mouth every 4 hours as needed for pain  o Zofran (8mg): take one pill by mouth every 8 hours for nausea or vomiting  o Omeprazole (40mg): take one pill by mouth daily for 90 days  o Carafate: dissolve tablet in small amount of water, drink as a slurry 4 times daily   Use an over the counter stool softener (Miralax) or laxative (Ducolax, milk of magnesium) if you feel constipated. You may not have a normal bowel movement being on a liquid diet.  Do NOT take any NSAID medication (Ibuprofen, Aleve, Motrin, Naproxen, Celebrex, etc) after surgery   No nicotine in any form (cigarettes, vape, marijuana, chew, gum, patches)   It is acceptable to space multiple medications throughout the day as needed   Oral medications should be crushed if they are large; acceptable to take whole pills if they are small   You may resume home medications unless instructed otherwise    Notify your surgeon if. ..   Fever of 101.5 degrees F or 38 degrees C (by mouth)   Shortness of breath or difficulty breathing   Chest pain or very fast heart rate   Significant leg swelling and/or pain   Redness, swelling, foul-smelling drainage, bleeding or heat around your incisions   Persistent vomiting and/or inability to keep fluids down, lightheadedness   Significant abdominal bloating, swelling or pain

## 2021-09-18 NOTE — PROGRESS NOTES
Discharge paperwork gone over with pt and mother at bedside.  Education completed and questions answered

## 2022-03-18 PROBLEM — M54.9 BACK PAIN: Status: ACTIVE | Noted: 2021-06-08

## 2022-03-18 NOTE — PROGRESS NOTES
Problem: Falls - Risk of  Goal: *Absence of Falls  Description: Document Sherman Dickson Fall Risk and appropriate interventions in the flowsheet.   Outcome: Progressing Towards Goal  Note: Fall Risk Interventions:  Mobility Interventions: Assess mobility with egress test, Bed/chair exit alarm, Patient to call before getting OOB         Medication Interventions: Assess postural VS orthostatic hypotension, Bed/chair exit alarm, Patient to call before getting OOB    Elimination Interventions: Call light in reach, Bed/chair exit alarm              Problem: Patient Education: Go to Patient Education Activity  Goal: Patient/Family Education  Outcome: Progressing Towards Goal Seun Avalos

## 2022-03-19 PROBLEM — E66.01 MORBID OBESITY (HCC): Status: ACTIVE | Noted: 2021-06-08

## 2022-03-19 PROBLEM — K56.609 SBO (SMALL BOWEL OBSTRUCTION) (HCC): Status: ACTIVE | Noted: 2021-09-15

## 2022-03-20 PROBLEM — G47.33 OSA (OBSTRUCTIVE SLEEP APNEA): Status: ACTIVE | Noted: 2021-06-08

## 2023-04-24 NOTE — PROGRESS NOTES
Nay Coronado MD   Bariatric & Advanced Laparoscopic Surgery & Endoscopy  [address] [place], [place] [address]Tessneftali Cervantes  Phone [phone]   Fax [phone]      Date of visit: 2023          Name: Radha Quiles      MRN: [id number]       : 1985       Age: 41 y.o. Sex: female        PCP: None None     CC:    Chief Complaint   Patient presents with    Follow-up     BAR FU bypass 21       HPI:    1.5 years post-op visit after a laparoscopic gastric bypass was done on 2021. She has lost 25 lbs since her last office visit. Weight History Graph    Surgeon: Yuriy Beltrán   DOS: 2021   Procedure: Bypass   Pre-op weight: 296   Ideal body weight: 157   Excess body weight: 139     Post-Surgical Weight Loss  Date: 23  Height: 5' 7\" (170.2 cm)  Weight: 184 lb (83.5 kg)  BMI: 28.82  Weight Change: -112 lbs  Total Weight Change: -112 lbs  % EBWL: 81%     Evaluation of Pre-operative Co-morbid Conditions:    Sleep Apnea - RS      Clinical Assessment and Physical Exam:    She is doing very well today and is feeling good. She reports that she has been adhering to protein first diet without difficulty. She denies any abdominal pain, nausea or vomiting or heartburn. She does not report having problems with constipation. She reports walking, biking and weights 3x per week for 60 minutes. She reports occasional rashes on the abdomen from excess skin. Protein:  60 grams per day  Fluids:    64 ounces per day  Exercise:  walking, biking, weights 3x for 60 minutes  No fever or chills. Incisions well healed. Denies reflux. Denies dysphagia. Regular bowel movements. Tolerating Protein first diet without difficulty.       PMH:    Past Medical History:   Diagnosis Date    Anxiety     Back pain     History of COVID-19 2020    SOB, cough, body aches, F, loss of taste & smell    History of palpitations     Migraine     Morbid obesity (HCC)     CA

## 2023-04-25 ENCOUNTER — OFFICE VISIT (OUTPATIENT)
Dept: SURGERY | Age: 38
End: 2023-04-25
Payer: COMMERCIAL

## 2023-04-25 VITALS
BODY MASS INDEX: 28.88 KG/M2 | HEIGHT: 67 IN | HEART RATE: 63 BPM | WEIGHT: 184 LBS | SYSTOLIC BLOOD PRESSURE: 146 MMHG | DIASTOLIC BLOOD PRESSURE: 94 MMHG

## 2023-04-25 DIAGNOSIS — L98.7 EXCESS SKIN OF ABDOMEN: ICD-10-CM

## 2023-04-25 DIAGNOSIS — Z71.82 EXERCISE COUNSELING: ICD-10-CM

## 2023-04-25 DIAGNOSIS — Z71.3 DIETARY COUNSELING: ICD-10-CM

## 2023-04-25 DIAGNOSIS — Z98.84 S/P GASTRIC BYPASS: ICD-10-CM

## 2023-04-25 DIAGNOSIS — E66.3 OVERWEIGHT (BMI 25.0-29.9): Primary | ICD-10-CM

## 2023-04-25 PROCEDURE — APPSS30 APP SPLIT SHARED TIME 16-30 MINUTES: Performed by: PHYSICIAN ASSISTANT

## 2023-04-25 PROCEDURE — 99215 OFFICE O/P EST HI 40 MIN: CPT | Performed by: SURGERY

## 2023-04-25 NOTE — PROGRESS NOTES
Delonte Pete, MS, RD, LD  Surgical Weight Loss Dietitian  [address], [address] [facility]  Joaquina Hunter  Phone [phone]   Fax [phone]    [facility] NUTRITION REASSESSMENT  Anthropometrics:    Ht: 57, wt: 184#, 117% IBW, 28.82 BMI  Pt has lost 25 lbs since last visit. Pt is 1.5 years post-op RYGB. Pt getting ~60+g protein/d and ~64+oz fluid/d. Pt is sometimes eating at least 3x/d. Pt is waiting 30 minutes after eating to drink liquids. She is drinking water, pepsi mini x1/day, gatorade regular. Pt is exercising 3d/w for 60 mins via walking, biking, and weights. Pt is sometimes taking MVI  - flintstones gummies. No food-related concerns at this time. Pt reports she is happy with her current weight - has been at this weight for about 1 month. Diet Recall:   Breakfast: grits and bologna   Lunch: sub with turkey and cheese   Dinner: pork chops, greens, rice   Drinks: water, pepsi mini       Nutrition Diagnosis:  Overweight R/T excessive energy intake as evidenced by BMI = 28.82 and 117 % IBW.  --ongoing, modified--BMI and %IBW adjusted to reflect weight loss--    Inadequate vitamin intake (B12) R/T increased nutrient needs as evidenced by s/p RYGB and pt reports not consuming bariatric MVI or B-complex MVI with flintstones MVI. Intervention:   Evaluated diet recall and identified modifications. Encouraged lean protein focus  Encouraged incorporation of non-starchy vegetables  Encouraged practice with meal priority; protein first followed by non-starchy vegetables and complex carbohydrates  Encouraged increased fluid intake  Encouraged avoidance of sugary/caffeinated/carbonated beverages  Re-educated pt on need to take additional b-complex MVI and Ca and encouraged practice  Discussed meal/food ideas on regular bariatric diet. Encouraged continued and increased activity. Encouraged incorporation of cardio/resistance training.   Pt goal of continued exercise and increase as

## (undated) DEVICE — STAPLER 60 RELOAD WHITE: Brand: SUREFORM

## (undated) DEVICE — SINGLE BASIN: Brand: CARDINAL HEALTH

## (undated) DEVICE — TIP COVER ACCESSORY

## (undated) DEVICE — DERMABOND SKIN ADH 0.7ML -- DERMABOND ADVANCED 12/BX

## (undated) DEVICE — 2000CC GUARDIAN II: Brand: GUARDIAN

## (undated) DEVICE — COLUMN DRAPE

## (undated) DEVICE — SUTURE VCRL SZ 2-0 L27IN ABSRB VLT L26MM SH 1/2 CIR J317H

## (undated) DEVICE — SUT SLK 2-0SH 30IN BLK --

## (undated) DEVICE — BAG SPEC REM 224ML W4XL6IN DIA10MM 1 HND GYN DISP ENDOPCH

## (undated) DEVICE — SUTURE MCRYL SZ 4-0 L27IN ABSRB UD L19MM PS-2 1/2 CIR PRIM Y426H

## (undated) DEVICE — APPLICATOR LAP 35 CM 2 RIGID VISTASEAL

## (undated) DEVICE — SEALANT TISS 4 CC FIBRIN VISTASEAL

## (undated) DEVICE — SEAL CANN F/12MM 8.5-13MM DISP -- DA VINCI

## (undated) DEVICE — AIRSEAL 8 MM ACCESS PORT AND LOW PROFILE OBTURATOR WITH BLADELESS OPTICAL TIP, 120 MM LENGTH: Brand: AIRSEAL

## (undated) DEVICE — SEAL

## (undated) DEVICE — GARMENT,MEDLINE,DVT,INT,CALF,MED, GEN2: Brand: MEDLINE

## (undated) DEVICE — APPLIER CLP M/L SHFT DIA5MM 15 LIG LIGAMAX 5

## (undated) DEVICE — BINDER ABD M/L H12IN FOR 46-62IN WHT 4 SLD PNL DSGN HOOP

## (undated) DEVICE — TRI-LUMEN FILTERED TUBE SET WITH ACTIVATED CHARCOAL FILTER: Brand: AIRSEAL

## (undated) DEVICE — BLADELESS OBTURATOR: Brand: WECK VISTA

## (undated) DEVICE — CONNECTOR TBNG WHT PLAS SUCT STR 5IN1 LTWT W/ M CONN

## (undated) DEVICE — SEAL UNIV 5-8MM DISP BX/10 -- DA VINCI XI - SNGL USE

## (undated) DEVICE — STAPLER 60: Brand: SUREFORM

## (undated) DEVICE — REDUCER CANN ENDOWRIST 12-8MM -- DA VINCI XI - SNGL USE

## (undated) DEVICE — STAPLER RELOAD SUREFORM 60 BLU -- DA VINCI XI

## (undated) DEVICE — SOLUTION IRRIG 1000ML H2O STRL BLT

## (undated) DEVICE — VISUALIZATION SYSTEM: Brand: CLEARIFY

## (undated) DEVICE — SUTURE SZ 0 27IN 5/8 CIR UR-6  TAPER PT VIOLET ABSRB VICRYL J603H

## (undated) DEVICE — ROBOTIC HYSTERECTOMY: Brand: MEDLINE INDUSTRIES, INC.

## (undated) DEVICE — 40585 XL ADVANCED TRENDELENBURG POSITIONING KIT: Brand: 40585 XL ADVANCED TRENDELENBURG POSITIONING KIT

## (undated) DEVICE — ARM DRAPE

## (undated) DEVICE — VESSEL SEALER XI EXTENDED DISP -- VESSEL

## (undated) DEVICE — TIP SUCTION IRRIGATION STRYKEFLOW